# Patient Record
Sex: FEMALE | Race: WHITE | NOT HISPANIC OR LATINO | Employment: STUDENT | URBAN - METROPOLITAN AREA
[De-identification: names, ages, dates, MRNs, and addresses within clinical notes are randomized per-mention and may not be internally consistent; named-entity substitution may affect disease eponyms.]

---

## 2020-07-12 ENCOUNTER — APPOINTMENT (EMERGENCY)
Dept: RADIOLOGY | Facility: HOSPITAL | Age: 25
End: 2020-07-12
Payer: COMMERCIAL

## 2020-07-12 ENCOUNTER — HOSPITAL ENCOUNTER (OUTPATIENT)
Facility: HOSPITAL | Age: 25
Setting detail: OBSERVATION
Discharge: HOME | End: 2020-07-12
Attending: SURGERY | Admitting: SURGERY
Payer: COMMERCIAL

## 2020-07-12 ENCOUNTER — HOSPITAL ENCOUNTER (EMERGENCY)
Facility: HOSPITAL | Age: 25
End: 2020-07-12
Attending: EMERGENCY MEDICINE
Payer: COMMERCIAL

## 2020-07-12 VITALS
OXYGEN SATURATION: 98 % | RESPIRATION RATE: 20 BRPM | HEART RATE: 96 BPM | DIASTOLIC BLOOD PRESSURE: 67 MMHG | SYSTOLIC BLOOD PRESSURE: 128 MMHG | TEMPERATURE: 99.8 F

## 2020-07-12 VITALS
RESPIRATION RATE: 20 BRPM | OXYGEN SATURATION: 100 % | TEMPERATURE: 98.2 F | HEART RATE: 85 BPM | SYSTOLIC BLOOD PRESSURE: 130 MMHG | DIASTOLIC BLOOD PRESSURE: 78 MMHG

## 2020-07-12 DIAGNOSIS — S91.012A LACERATION OF LEFT ANKLE, INITIAL ENCOUNTER: Primary | ICD-10-CM

## 2020-07-12 DIAGNOSIS — S91.019A LACERATION OF ANKLE: Primary | ICD-10-CM

## 2020-07-12 LAB
ABO GROUP BLD: NORMAL
ABO GROUP BLD: NORMAL
ANION GAP SERPL CALCULATED.3IONS-SCNC: 15 MMOL/L (ref 4–13)
APTT PPP: 23 SECONDS (ref 23–37)
BASOPHILS # BLD AUTO: 0.04 THOUSANDS/ΜL (ref 0–0.1)
BASOPHILS NFR BLD AUTO: 0 % (ref 0–1)
BLD GP AB SCN SERPL QL: NEGATIVE
BUN SERPL-MCNC: 12 MG/DL (ref 5–25)
CALCIUM SERPL-MCNC: 9 MG/DL (ref 8.3–10.1)
CHLORIDE SERPL-SCNC: 102 MMOL/L (ref 100–108)
CO2 SERPL-SCNC: 20 MMOL/L (ref 21–32)
CREAT SERPL-MCNC: 0.92 MG/DL (ref 0.6–1.3)
EOSINOPHIL # BLD AUTO: 0.1 THOUSAND/ΜL (ref 0–0.61)
EOSINOPHIL NFR BLD AUTO: 1 % (ref 0–6)
ERYTHROCYTE [DISTWIDTH] IN BLOOD BY AUTOMATED COUNT: 12.6 % (ref 11.6–15.1)
GFR SERPL CREATININE-BSD FRML MDRD: 88 ML/MIN/1.73SQ M
GLUCOSE SERPL-MCNC: 98 MG/DL (ref 65–140)
HCT VFR BLD AUTO: 34.1 % (ref 34.8–46.1)
HGB BLD-MCNC: 11.5 G/DL (ref 11.5–15.4)
IMM GRANULOCYTES # BLD AUTO: 0.02 THOUSAND/UL (ref 0–0.2)
IMM GRANULOCYTES NFR BLD AUTO: 0 % (ref 0–2)
INR PPP: 1.13 (ref 0.84–1.19)
LYMPHOCYTES # BLD AUTO: 2.42 THOUSANDS/ΜL (ref 0.6–4.47)
LYMPHOCYTES NFR BLD AUTO: 25 % (ref 14–44)
MCH RBC QN AUTO: 30.4 PG (ref 26.8–34.3)
MCHC RBC AUTO-ENTMCNC: 33.7 G/DL (ref 31.4–37.4)
MCV RBC AUTO: 90 FL (ref 82–98)
MONOCYTES # BLD AUTO: 0.68 THOUSAND/ΜL (ref 0.17–1.22)
MONOCYTES NFR BLD AUTO: 7 % (ref 4–12)
NEUTROPHILS # BLD AUTO: 6.36 THOUSANDS/ΜL (ref 1.85–7.62)
NEUTS SEG NFR BLD AUTO: 67 % (ref 43–75)
NRBC BLD AUTO-RTO: 0 /100 WBCS
PLATELET # BLD AUTO: 237 THOUSANDS/UL (ref 149–390)
PMV BLD AUTO: 11 FL (ref 8.9–12.7)
POTASSIUM SERPL-SCNC: 3.2 MMOL/L (ref 3.5–5.3)
PROTHROMBIN TIME: 14.2 SECONDS (ref 11.6–14.5)
RBC # BLD AUTO: 3.78 MILLION/UL (ref 3.81–5.12)
RH BLD: POSITIVE
RH BLD: POSITIVE
SARS-COV-2 RNA RESP QL NAA+PROBE: NEGATIVE
SODIUM SERPL-SCNC: 137 MMOL/L (ref 136–145)
SPECIMEN EXPIRATION DATE: NORMAL
WBC # BLD AUTO: 9.62 THOUSAND/UL (ref 4.31–10.16)

## 2020-07-12 PROCEDURE — 96372 THER/PROPH/DIAG INJ SC/IM: CPT

## 2020-07-12 PROCEDURE — 99285 EMERGENCY DEPT VISIT HI MDM: CPT

## 2020-07-12 PROCEDURE — 73610 X-RAY EXAM OF ANKLE: CPT

## 2020-07-12 PROCEDURE — 86900 BLOOD TYPING SEROLOGIC ABO: CPT | Performed by: EMERGENCY MEDICINE

## 2020-07-12 PROCEDURE — NC001 PR NO CHARGE: Performed by: NURSE PRACTITIONER

## 2020-07-12 PROCEDURE — 86850 RBC ANTIBODY SCREEN: CPT | Performed by: EMERGENCY MEDICINE

## 2020-07-12 PROCEDURE — 90471 IMMUNIZATION ADMIN: CPT

## 2020-07-12 PROCEDURE — 87635 SARS-COV-2 COVID-19 AMP PRB: CPT | Performed by: EMERGENCY MEDICINE

## 2020-07-12 PROCEDURE — NC001 PR NO CHARGE: Performed by: ORTHOPAEDIC SURGERY

## 2020-07-12 PROCEDURE — 97116 GAIT TRAINING THERAPY: CPT

## 2020-07-12 PROCEDURE — 97166 OT EVAL MOD COMPLEX 45 MIN: CPT

## 2020-07-12 PROCEDURE — 85610 PROTHROMBIN TIME: CPT

## 2020-07-12 PROCEDURE — 96365 THER/PROPH/DIAG IV INF INIT: CPT

## 2020-07-12 PROCEDURE — 99285 EMERGENCY DEPT VISIT HI MDM: CPT | Performed by: EMERGENCY MEDICINE

## 2020-07-12 PROCEDURE — 85025 COMPLETE CBC W/AUTO DIFF WBC: CPT

## 2020-07-12 PROCEDURE — 86901 BLOOD TYPING SEROLOGIC RH(D): CPT | Performed by: EMERGENCY MEDICINE

## 2020-07-12 PROCEDURE — 97162 PT EVAL MOD COMPLEX 30 MIN: CPT

## 2020-07-12 PROCEDURE — 99219 PR INITIAL OBSERVATION CARE/DAY 50 MINUTES: CPT | Performed by: SURGERY

## 2020-07-12 PROCEDURE — 90715 TDAP VACCINE 7 YRS/> IM: CPT | Performed by: EMERGENCY MEDICINE

## 2020-07-12 PROCEDURE — 85730 THROMBOPLASTIN TIME PARTIAL: CPT

## 2020-07-12 PROCEDURE — 36415 COLL VENOUS BLD VENIPUNCTURE: CPT

## 2020-07-12 PROCEDURE — 80048 BASIC METABOLIC PNL TOTAL CA: CPT

## 2020-07-12 RX ORDER — ACETAMINOPHEN 325 MG/1
975 TABLET ORAL EVERY 8 HOURS SCHEDULED
Qty: 30 TABLET | Refills: 0 | Status: SHIPPED | OUTPATIENT
Start: 2020-07-12

## 2020-07-12 RX ORDER — OXYCODONE HYDROCHLORIDE 5 MG/1
5 TABLET ORAL EVERY 4 HOURS PRN
Qty: 30 TABLET | Refills: 0 | Status: SHIPPED | OUTPATIENT
Start: 2020-07-12 | End: 2020-07-22

## 2020-07-12 RX ORDER — POTASSIUM CHLORIDE 20 MEQ/1
40 TABLET, EXTENDED RELEASE ORAL ONCE
Status: COMPLETED | OUTPATIENT
Start: 2020-07-12 | End: 2020-07-12

## 2020-07-12 RX ORDER — LIDOCAINE HYDROCHLORIDE 10 MG/ML
10 INJECTION, SOLUTION EPIDURAL; INFILTRATION; INTRACAUDAL; PERINEURAL ONCE
Status: COMPLETED | OUTPATIENT
Start: 2020-07-12 | End: 2020-07-12

## 2020-07-12 RX ORDER — KETOROLAC TROMETHAMINE 30 MG/ML
15 INJECTION, SOLUTION INTRAMUSCULAR; INTRAVENOUS ONCE
Status: COMPLETED | OUTPATIENT
Start: 2020-07-12 | End: 2020-07-12

## 2020-07-12 RX ORDER — LEVOFLOXACIN 750 MG/1
750 TABLET ORAL EVERY 24 HOURS
Qty: 7 TABLET | Refills: 0 | Status: SHIPPED | OUTPATIENT
Start: 2020-07-13 | End: 2020-07-20

## 2020-07-12 RX ORDER — DOCUSATE SODIUM 100 MG/1
100 CAPSULE, LIQUID FILLED ORAL 2 TIMES DAILY
Status: DISCONTINUED | OUTPATIENT
Start: 2020-07-12 | End: 2020-07-12 | Stop reason: HOSPADM

## 2020-07-12 RX ORDER — CEFAZOLIN SODIUM 2 G/50ML
2000 SOLUTION INTRAVENOUS ONCE
Status: COMPLETED | OUTPATIENT
Start: 2020-07-12 | End: 2020-07-12

## 2020-07-12 RX ORDER — OXYCODONE HYDROCHLORIDE 10 MG/1
10 TABLET ORAL EVERY 4 HOURS PRN
Status: DISCONTINUED | OUTPATIENT
Start: 2020-07-12 | End: 2020-07-12 | Stop reason: HOSPADM

## 2020-07-12 RX ORDER — ONDANSETRON 2 MG/ML
4 INJECTION INTRAMUSCULAR; INTRAVENOUS EVERY 6 HOURS PRN
Status: DISCONTINUED | OUTPATIENT
Start: 2020-07-12 | End: 2020-07-12 | Stop reason: HOSPADM

## 2020-07-12 RX ORDER — ACETAMINOPHEN 325 MG/1
975 TABLET ORAL EVERY 8 HOURS SCHEDULED
Status: DISCONTINUED | OUTPATIENT
Start: 2020-07-12 | End: 2020-07-12 | Stop reason: HOSPADM

## 2020-07-12 RX ORDER — LEVOFLOXACIN 750 MG/1
750 TABLET ORAL EVERY 24 HOURS
Status: DISCONTINUED | OUTPATIENT
Start: 2020-07-12 | End: 2020-07-12 | Stop reason: HOSPADM

## 2020-07-12 RX ORDER — OXYCODONE HYDROCHLORIDE 5 MG/1
5 TABLET ORAL EVERY 4 HOURS PRN
Status: DISCONTINUED | OUTPATIENT
Start: 2020-07-12 | End: 2020-07-12 | Stop reason: HOSPADM

## 2020-07-12 RX ADMIN — LIDOCAINE HYDROCHLORIDE 10 ML: 10 INJECTION, SOLUTION EPIDURAL; INFILTRATION; INTRACAUDAL; PERINEURAL at 02:16

## 2020-07-12 RX ADMIN — KETOROLAC TROMETHAMINE 15 MG: 30 INJECTION, SOLUTION INTRAMUSCULAR; INTRAVENOUS at 01:58

## 2020-07-12 RX ADMIN — POTASSIUM CHLORIDE 40 MEQ: 1500 TABLET, EXTENDED RELEASE ORAL at 12:30

## 2020-07-12 RX ADMIN — TETANUS TOXOID, REDUCED DIPHTHERIA TOXOID AND ACELLULAR PERTUSSIS VACCINE, ADSORBED 0.5 ML: 5; 2.5; 8; 8; 2.5 SUSPENSION INTRAMUSCULAR at 01:58

## 2020-07-12 RX ADMIN — LEVOFLOXACIN 750 MG: 750 TABLET, FILM COATED ORAL at 09:14

## 2020-07-12 RX ADMIN — POTASSIUM CHLORIDE 40 MEQ: 1500 TABLET, EXTENDED RELEASE ORAL at 09:14

## 2020-07-12 RX ADMIN — CEFAZOLIN SODIUM 2000 MG: 2 SOLUTION INTRAVENOUS at 02:05

## 2020-07-12 RX ADMIN — CEFEPIME HYDROCHLORIDE 2000 MG: 2 INJECTION, POWDER, FOR SOLUTION INTRAVENOUS at 10:00

## 2020-07-12 NOTE — CONSULTS
Orthopedics   Gege Lucio Hackett 22 y o  female MRN: [id number]  Unit/Bed#: ED 14      Chief Complaint:   left ankle pain    HPI:  22 y  o female status post mechanical tripping injury complaining of left ankle pain and inability to bear weight  She notes being drunk, kicking her foot into a fishtank in a dark basement, and feeling immediate pain  No history of injury to this ankle  She was initially seen at an  UB and transferred here for further evaluation  Review Of Systems:   · Skin: Normal  · Neuro: See HPI  · Musculoskeletal: See HPI  · 14 point review of systems negative except as stated above     Past Medical History:   History reviewed  No pertinent past medical history  Past Surgical History:   History reviewed  No pertinent surgical history  Family History:  Family history reviewed and non-contributory  History reviewed  No pertinent family history      Social History:  Social History     Socioeconomic History    Marital status: Single     Spouse name: None    Number of children: None    Years of education: None    Highest education level: None   Occupational History    None   Social Needs    Financial resource strain: None    Food insecurity:     Worry: None     Inability: None    Transportation needs:     Medical: None     Non-medical: None   Tobacco Use    Smoking status: Never Smoker    Smokeless tobacco: Never Used   Substance and Sexual Activity    Alcohol use: Yes     Comment: patient report having 6 shots tonight    Drug use: Yes     Types: Marijuana    Sexual activity: None   Lifestyle    Physical activity:     Days per week: None     Minutes per session: None    Stress: None   Relationships    Social connections:     Talks on phone: None     Gets together: None     Attends [employer]ism service: None     Active member of club or organization: None     Attends meetings of clubs or organizations: None     Relationship status: None    Intimate partner violence:     Fear of current or ex partner: None     Emotionally abused: None     Physically abused: None     Forced sexual activity: None   Other Topics Concern    None   Social History Narrative    None       Allergies:   No Known Allergies        Labs:  0   Lab Value Date/Time    HCT 34 1 (L) 07/12/2020 0151    HGB 11 5 07/12/2020 0151    INR 1 13 07/12/2020 0151    WBC 9 62 07/12/2020 0151       Meds:    Current Facility-Administered Medications:     acetaminophen (TYLENOL) tablet 975 mg, 975 mg, Oral, Q8H [address], Fercho Villanueva MD    docusate sodium (COLACE) capsule 100 mg, 100 mg, Oral, BID, Fercho Villanueva MD    levofloxacin (LEVAQUIN) tablet 750 mg, 750 mg, Oral, Q24H, Beata Villanueva MD    ondansetron (ZOFRAN) injection 4 mg, 4 mg, Intravenous, Q6H PRN, Fercho Villanueva MD    oxyCODONE (ROXICODONE) immediate release tablet 10 mg, 10 mg, Oral, Q4H PRN, Fercho Villanueva MD    oxyCODONE (ROXICODONE) IR tablet 5 mg, 5 mg, Oral, Q4H PRN, Fercho Villanueva MD    potassium chloride (K-DUR,KLOR-CON) CR tablet 40 mEq, 40 mEq, Oral, Once, Nohemi Ibarra MD  No current outpatient medications on file  Blood Culture:   No results found for: BLOODCX    Wound Culture:   No results found for: WOUNDCULT    Ins and Outs:  No intake/output data recorded  Physical Exam:   /70   Pulse 90   Temp 99 8 °F (37 7 °C) (Tympanic)   Resp 20   LMP 07/01/2020 (Exact Date)   SpO2 100%   Gen: Alert and oriented to person, place, time  HEENT: EOMI, eyes clear, moist mucus membranes, hearing intact  Respiratory: Bilateral chest rise   No audible wheezing found  Cardiovascular: No audible murmurs  Abdomen: soft  Musculoskeletal: left lower extremity  · 3x2 cm anterolateral ankle laceration with exposed soft tissue  · Tender to palpation around laceration  · Unable to dorsiflex ankle or toes, able to plantarflex ankle and toes  · Able to invert at ankle  · Decreased sensation in DP distribution, sensation intact in SP, sural, saphenous, and tibial nerve distributions  · Dopplerable PT and DP pulses    Radiology:   I personally reviewed the films  X-rays left ankle shows no acute fractures or dislocations  Procedure- Orthopedics   Orval Or Lew 22 y o  female MRN: [id number]  Unit/Bed#: ED 14    Procedure:  Joint challenge and laceration closre    Once adequate anesthesia was obtained 50 cc were injected into the ankle joint without extravasation of fluid into ankle joint  Laceration was then closed loosely with 2 3-0 Nylon sutures, one running and one simple  Sterile dressing was applied and patient was placed into posterior slab splint  Patient tolerated the procedure well  Assessment:  22 y  o female status post ankle injury with anterolateral ankle laceration and possible tendon injury    Plan:   · NWB left lower extremity in posterior slab splint  · Joint challenge negative at 50 cc   Laceration closed loosely with running 3-0 Nylon sutures  · FU with Dr Flores  next week for further evaluation  · Analgesics for pain  · Would give antibiotics for 1 week with 3rd or 4th generation cephalosporin or flouroquinoline  · Tetanus updated  · Dispo: [id number] Jackeline Coronado for discharge from [address] [place] [address]

## 2020-07-12 NOTE — EMTALA/ACUTE CARE TRANSFER
[facility] EMERGENCY DEPARTMENT  [address] [facility] [address]  Dept: [phone]      EMTALA TRANSFER CONSENT    NAME Comfort Garrido                                         1995                              MRN [id number]    I have been informed of my rights regarding examination, treatment, and transfer   by Dr Santy Jin MD    Benefits: Specialized equipment and/or services available at the receiving facility (Include comment)________________________    Risks: Potential for delay in receiving treatment, Potential deterioration of medical condition, Loss of IV, Increased discomfort during transfer      Consent for Transfer:  I acknowledge that my medical condition has been evaluated and explained to me by the emergency department physician or other qualified medical person and/or my attending physician, who has recommended that I be transferred to the service of  Accepting Physician: Dr Byrd November at [address] [facility] Name, [address] : [facility]  The above potential benefits of such transfer, the potential risks associated with such transfer, and the probable risks of not being transferred have been explained to me, and I fully understand them  The doctor has explained that, in my case, the benefits of transfer outweigh the risks  I agree to be transferred  I authorize the performance of emergency medical procedures and treatments upon me in both transit and upon arrival at the receiving facility  Additionally, I authorize the release of any and all medical records to the receiving facility and request they be transported with me, if possible  I understand that the safest mode of transportation during a medical emergency is an ambulance and that the Hospital advocates the use of this mode of transport   Risks of traveling to the receiving facility by car, including absence of medical control, life sustaining equipment, such as oxygen, and medical personnel has been explained to me and I fully understand them  (MARTHA CORRECT BOX BELOW)  [  ]  I consent to the stated transfer and to be transported by ambulance/helicopter  [  ]  I consent to the stated transfer, but refuse transportation by ambulance and accept full responsibility for my transportation by car  I understand the risks of non-ambulance transfers and I exonerate the Hospital and its staff from any deterioration in my condition that results from this refusal     X___________________________________________    DATE  20  TIME________  Signature of patient or legally responsible individual signing on patient behalf           RELATIONSHIP TO PATIENT_________________________          Provider Certification    NAME Dez Mayes                                        FRANCES 1995                              MRN [id number]    A medical screening exam was performed on the above named patient  Based on the examination:    Condition Necessitating Transfer The encounter diagnosis was Laceration of ankle  Patient Condition: The patient has been stabilized such that within reasonable medical probability, no material deterioration of the patient condition or the condition of the unborn child(mateus) is likely to result from the transfer    Reason for Transfer: Level of Care needed not available at this facility(Trauma evaluation)    Transfer Requirements: Facility [facility]   · Space available and qualified personnel available for treatment as acknowledged by    · Agreed to accept transfer and to provide appropriate medical treatment as acknowledged by       Dr Ninfa Lucero  · Appropriate medical records of the examination and treatment of the patient are provided at the time of transfer   [address], [address] _______  · Transfer will be performed by qualified personnel from    and appropriate transfer equipment as required, including the use of necessary and appropriate life support measures      Provider Certification: I have examined the patient and explained the following risks and benefits of being transferred/refusing transfer to the patient/family:  General risk, such as traffic hazards, adverse weather conditions, rough terrain or turbulence, possible failure of equipment (including vehicle or aircraft), or consequences of actions of persons outside the control of the transport personnel, Unanticipated needs of medical equipment and personnel during transport, Risk of worsening condition, The possibility of a transport vehicle being unavailable      Based on these reasonable risks and benefits to the patient and/or the unborn child(mateus), and based upon the information available at the time of the patients examination, I certify that the medical benefits reasonably to be expected from the provision of appropriate medical treatments at another medical facility outweigh the increasing risks, if any, to the individuals medical condition, and in the case of labor to the unborn child, from effecting the transfer      X____________________________________________ DATE 07/12/20        TIME_______      ORIGINAL - SEND TO MEDICAL RECORDS   COPY - SEND WITH PATIENT DURING TRANSFER

## 2020-07-12 NOTE — UTILIZATION REVIEW
Initial Clinical Review    Admission: Date/Time/Statement: Admission Orders (From admission, onward)     Ordered        07/12/20 0420  Place in Observation  Once                   Orders Placed This Encounter   Procedures    Place in Observation     Standing Status:   Standing     Number of Occurrences:   1     Order Specific Question:   Admitting Physician     Answer:   Tom Renner     Order Specific Question:   Level of Care     Answer:   Med Surg [16]     ED Arrival Information     Expected Arrival Acuity Means of Arrival Escorted By Service Admission Type    7/12/2020 7/12/2020 03:03 Immediate Ambulance [facility] Dax Staggers) Trauma Emergency    Arrival Complaint    Foot injury        Chief Complaint   Patient presents with    Trauma     Trauma transfer from [facility]     Assessment/Plan: 22 y o  female with no significant PMHx who presents as a trauma transfer from [address] [place] [address] after she sustained an open laceration to her left anterior lateral ankle  Pt was drinking today, went into a dark basement and somehow stepped on a fish tank, the glass broke and lacerated her ankle  Neurovascularly intact pre transfer  No other injuries noted  Did not hit her head or have loc  Patient's bleeding was able to be controlled at outside hospital, transferred in a pressure dressing  States that now with the pressure dressing removed, she feels like the sensation is coming back in her foot  Admit observation to M/S unit under trauma service with left lateral anterior ankle open laceration, possible tendinous injury   Trauma Plan:   -Ortho consult:  · NWB left lower extremity in posterior slab splint  · Joint challenge negative at 50 cc   Laceration closed loosely with running 3-0 Nylon sutures  · FU with Dr Brendon Huber next week for further evaluation  · Analgesics for pain  · Would give antibiotics for 1 week with 3rd or 4th generation cephalosporin or flouroquinoline  · Tetanus updated  -PT, OT to see        ED Triage Vitals   Temperature Pulse Respirations Blood Pressure SpO2   07/12/20 0315 07/12/20 0315 07/12/20 0315 07/12/20 0315 07/12/20 0315   99 8 °F (37 7 °C) 90 20 136/70 100 %      Temp Source Heart Rate Source Patient Position - Orthostatic VS BP Location FiO2 (%)   07/12/20 0315 07/12/20 0315 07/12/20 0315 -- --   Tympanic Monitor Lying        Pain Score       07/12/20 0318       2        Wt Readings from Last 1 Encounters:   07/12/20 70 kg (154 lb 5 2 oz)     Additional Vital Signs:   Date/Time  Temp  Pulse  Resp  BP  SpO2  Patient Position - Orthostatic VS   07/12/20 0430    96  20  128/67  98 %  Lying   07/12/20 0415    100  20  145/84  98 %  Lying   07/12/20 0315  99 8 °F (37 7 °C)  90  20  136/70  100 %  Lying       Pertinent Labs/Diagnostic Test Results:   XR L ankle 7/12 -- Overlying bandage as described, but no acute osseous abnormality is seen   No discrete radiopaque foreign body is seen  Results from last 7 days   Lab Units 07/12/20  0206   SARS-COV-2  Negative     Results from last 7 days   Lab Units 07/12/20  0151   WBC Thousand/uL 9 62   HEMOGLOBIN g/dL 11 5   HEMATOCRIT % 34 1*   PLATELETS Thousands/uL 237   NEUTROS ABS Thousands/µL 6 36     Results from last 7 days   Lab Units 07/12/20  0151   SODIUM mmol/L 137   POTASSIUM mmol/L 3 2*   CHLORIDE mmol/L 102   CO2 mmol/L 20*   ANION GAP mmol/L 15*   BUN mg/dL 12   CREATININE mg/dL 0 92   EGFR ml/min/1 73sq m 88   CALCIUM mg/dL 9 0     Results from last 7 days   Lab Units 07/12/20  0151   GLUCOSE RANDOM mg/dL 98     Results from last 7 days   Lab Units 07/12/20  0151   PROTIME seconds 14 2   INR  1 13   PTT seconds 23         History reviewed  No pertinent past medical history    Present on Admission:  **None**      Admitting Diagnosis: Foot injury [B86 649A]  Laceration of left ankle, initial encounter [Z21 012A]  Age/Sex: 22 y o  female  Admission Orders:  Scheduled Medications:  Medications:  acetaminophen 975 mg Oral [facility] cefepime 2,000 mg Intravenous Once   docusate sodium 100 mg Oral BID   levofloxacin 750 mg Oral Q24H        PRN Meds:  ondansetron 4 mg Intravenous Q6H PRN   oxyCODONE 10 mg Oral Q4H PRN   oxyCODONE 5 mg Oral Q4H PRN       IP CONSULT TO ORTHOPEDIC SURGERY    Network Utilization Review Department  [email or web address]  ATTENTION: Please call with any questions or concerns to [phone] and carefully listen to the prompts so that you are directed to the right person  All voicemails are confidential   Nick Delude all requests for admission clinical reviews, approved or denied determinations and any other requests to dedicated fax number below belonging to the campus where the patient is receiving treatment   List of dedicated fax numbers for the Facilities:  [address] DENIALS (Administrative/Medical Necessity) [phone]   [address] (Maternity/NICU/Pediatrics) [phone]   Jossy Cuello [phone]   [facility] [phone]   [place]  [phone]   Estevan Garcia [phone]   [address] [place] [address] [phone]   [facility]  [phone]   [address] [facility] [address]  [address] [facility] [address] [facility] [phone]

## 2020-07-12 NOTE — OCCUPATIONAL THERAPY NOTE
Occupational Therapy Evaluation     Patient Name: Marine Flowers  [id number] Date: 7/12/2020  Problem List  Principal Problem:    Laceration of left ankle    Past Medical History  History reviewed  No pertinent past medical history  Past Surgical History  History reviewed  No pertinent surgical history  07/12/20 1047   Note Type   Note type Eval only   Restrictions/Precautions   Weight Bearing Precautions Per Order Yes   LLE Weight Bearing Per Order NWB   Braces or Orthoses Splint  (L LE)   Other Precautions Multiple lines   Pain Assessment   Pain Assessment Tool 0-10   Pain Score No Pain   Home Living   Type of [address] [facility] Two level;Stairs to enter with rails   Bathroom Shower/Tub Walk-in shower   Bathroom Toilet Standard   Bathroom Equipment   (denies)   [address]   Additional Comments Pt lives in a 2 story home with 1+1 CARLOS  Prior Function   Level of [place] Independent with ADLs and functional mobility   Lives With Family  (Father)   ADL Assistance Independent   IADLs Independent   Falls in the last 6 months 0   Vocational Student   Lifestyle   Autonomy Pt reports being I with ADLS, IADLS and mobility without device PTA  (+)     Reciprocal Relationships Pt lives with her father who she reports is able to assist as needed upon d/c     Service to Others Pt works 3 jobs and goes to school    Intrinsic Gratification Active PTA   Subjective   Subjective Pt reports that she has no concerns about returning home      ADL   Where Assessed Edge of bed   Eating Assistance 7  Independent   Grooming Assistance 5  Supervision/Setup   UB Bathing Assistance 5  Supervision/Setup   LB Bathing Assistance 5  Supervision/Setup   UB Dressing Assistance 5  Supervision/Setup   LB Dressing Assistance 5  Supervision/Setup   Toileting Assistance  5  Supervision/Setup   Bed Mobility   Supine to Sit 6  Modified independent   Sit to Supine 6 Modified independent   Transfers   Sit to Stand 6  Modified independent   Stand to Sit 6  Modified independent   Functional Mobility   Functional Mobility 5  Supervision   Additional Comments Pt demonstrated household mobility with 1 seated rest break  Additional items Crutches   Balance   Static Sitting Good   Dynamic Sitting Fair +   Static Standing Fair +   Dynamic Standing Fair +   Ambulatory Fair   Activity Tolerance   Activity Tolerance Patient tolerated treatment well   Medical Staff Made Aware PT Simba   Nurse Made Aware RN confirmed okay to see pt   RUE Assessment   RUE Assessment WFL   LUE Assessment   LUE Assessment WFL   Cognition   Overall Cognitive Status WFL   Arousal/Participation Alert; Cooperative   Attention Within functional limits   Orientation Level Oriented X4   Memory Within functional limits   Following Commands Follows all commands and directions without difficulty   Comments Pt is pleasant and cooperative to work with therapy  Assessment   Limitation Decreased ADL status; Decreased high-level ADLs   Prognosis Good   Assessment Pt is a 22 y o  female admitted to \[facility]\"" on 7/12/2020 w/ laceration of left ankle  Pt with active OT orders  Pt is NWB on L LE with splint on  Pt resides in a 2 story home with 1+1 CARLOS  Pt lives with her father who she reports is able to assist as needed upon d/c  Pt was I w/  ADLS and IADLS, (+) drove, & required no use of DME PTA  Currently pt is mod I for functional transfers and functional mobility, and supervision for ADLS overall  Based on the aforementioned OT evaluation, functional performance deficits, and assessments, pt has been identified as a moderate complexity evaluation  From OT standpoint, anticipate d/c home with family support  Recommend continued participation in [address] [facility] and functional mobility with staff  No further acute OT needs, d/c OT      Goals   Patient Goals To return home today   Recommendation   OT Discharge Recommendation Return to previous environment with social support   OT - OK to Discharge Yes  (When medically appropriate)   Modified Orange Scale   Modified Orange Scale 3     Beverly Edwards, SUSHMA, OTR/L

## 2020-07-12 NOTE — TRAUMA DOCUMENTATION
Report called to [facility] at [facility]

## 2020-07-12 NOTE — ED PROVIDER NOTES
History  Chief Complaint   Patient presents with    Foot Laceration     Stepped on a fish tank, cut ankle/foot     Note entered in Error, please see trauma H&P note which was attached to the encounter at [facility]          None       History reviewed  No pertinent past medical history  History reviewed  No pertinent surgical history  History reviewed  No pertinent family history  I have reviewed and agree with the history as documented      E-Cigarette/Vaping     E-Cigarette/Vaping Substances     Social History     Tobacco Use    Smoking status: Never Smoker    Smokeless tobacco: Never Used   Substance Use Topics    Alcohol use: Yes     Comment: patient report having 6 shots tonight    Drug use: Yes     Types: Marijuana       Review of Systems    Physical Exam  Physical Exam    Vital Signs  ED Triage Vitals [07/12/20 0056]   Temperature Pulse Respirations Blood Pressure SpO2   98 2 °F (36 8 °C) 96 20 130/76 100 %      Temp Source Heart Rate Source Patient Position - Orthostatic VS BP Location FiO2 (%)   Tympanic Monitor Sitting Left arm --      Pain Score       Worst Possible Pain           Vitals:    07/12/20 0115 07/12/20 0130 07/12/20 0145 07/12/20 0200   BP: 123/73 122/74 121/58 130/78   Pulse: 105 (!) 107 (!) 121 85   Patient Position - Orthostatic VS: Lying Lying Lying Lying         Visual Acuity  Visual Acuity      Most Recent Value   L Pupil Size (mm)  4   R Pupil Size (mm)  4          ED Medications  Medications   lidocaine (PF) (XYLOCAINE-MPF) 1 % injection 10 mL (10 mL Infiltration Given 7/12/20 0216)   tetanus-diphtheria-acellular pertussis (BOOSTRIX) IM injection 0 5 mL (0 5 mL Intramuscular Given 7/12/20 0158)   ketorolac (TORADOL) injection 15 mg (15 mg Intramuscular Given 7/12/20 0158)   ceFAZolin (ANCEF) IVPB (premix) 2,000 mg 50 mL (2,000 mg Intravenous New Bag 7/12/20 0205)       Diagnostic Studies  Results Reviewed     Procedure Component Value Units Date/Time Novel Coronavirus [facility] [[id number]]  (Normal) Collected:  07/12/20 0206    Lab Status:  Final result Specimen:  Nares from Nose Updated:  07/12/20 0324     SARS-CoV-2 Negative    Narrative: The specimen collection materials, transport medium, and/or testing methodology utilized in the production of these test results have been proven to be reliable in a limited validation with an abbreviated program under the Emergency Utilization Authorization provided by the FDA  Testing reported as "Presumptive positive" will be confirmed with secondary testing with a reference laboratory to ensure result accuracy  Clinical caution and judgement should be used with the interpretation of these results with consideration of the clinical impression and other laboratory testing  Testing reported as "Positive" or "Negative" has been proven to be accurate according to standard laboratory validation requirements  All testing is performed with control materials showing appropriate reactivity at standard intervals        APTT [[id number]]  (Normal) Collected:  07/12/20 0151    Lab Status:  Final result Specimen:  Blood from Arm, Right Updated:  07/12/20 0233     PTT 23 seconds     Protime-INR [[id number]]  (Normal) Collected:  07/12/20 0151    Lab Status:  Final result Specimen:  Blood from Arm, Right Updated:  07/12/20 0233     Protime 14 2 seconds      INR 0 16    Basic metabolic panel [[id number]]  (Abnormal) Collected:  07/12/20 0151    Lab Status:  Final result Specimen:  Blood from Arm, Right Updated:  07/12/20 0232     Sodium 137 mmol/L      Potassium 3 2 mmol/L      Chloride 102 mmol/L      CO2 20 mmol/L      ANION GAP 15 mmol/L      BUN 12 mg/dL      Creatinine 0 92 mg/dL      Glucose 98 mg/dL      Calcium 9 0 mg/dL      eGFR 88 ml/min/1 73sq m     Narrative:       [place] guidelines for Chronic Kidney Disease (CKD):     Stage 1 with normal or high GFR (GFR > 90 mL/min/1 73 square meters)    Stage 2 Mild CKD (GFR = 60-89 mL/min/1 73 square meters)    Stage 3A Moderate CKD (GFR = 45-59 mL/min/1 73 square meters)    Stage 3B Moderate CKD (GFR = 30-44 mL/min/1 73 square meters)    Stage 4 Severe CKD (GFR = 15-29 mL/min/1 73 square meters)    Stage 5 End Stage CKD (GFR <15 mL/min/1 73 square meters)  Note: GFR calculation is accurate only with a steady state creatinine    CBC and differential [[id number]]  (Abnormal) Collected:  07/12/20 0151    Lab Status:  Final result Specimen:  Blood from Arm, Right Updated:  07/12/20 0202     WBC 9 62 Thousand/uL      RBC 3 78 Million/uL      Hemoglobin 11 5 g/dL      Hematocrit 34 1 %      MCV 90 fL      MCH 30 4 pg      MCHC 33 7 g/dL      RDW 12 6 %      MPV 11 0 fL      Platelets 084 Thousands/uL      nRBC 0 /100 WBCs      Neutrophils Relative 67 %      Immat GRANS % 0 %      Lymphocytes Relative 25 %      Monocytes Relative 7 %      Eosinophils Relative 1 %      Basophils Relative 0 %      Neutrophils Absolute 6 36 Thousands/µL      Immature Grans Absolute 0 02 Thousand/uL      Lymphocytes Absolute 2 42 Thousands/µL      Monocytes Absolute 0 68 Thousand/µL      Eosinophils Absolute 0 10 Thousand/µL      Basophils Absolute 0 04 Thousands/µL     POCT pregnancy, urine [[id number]]     Lab Status:  No result                  XR ankle 3+ views LEFT    (Results Pending)              Procedures  Procedures         ED Course  ED Course as of Jul 12 0342   Sun Jul 12, 2020   0147 Tourniquet applied for approximately 20 minutes 10 w/ active bleeding, 5 w/ tighter tourniquet, was able to identify an arterial bleeder distal aspect of the laceration, the bleeding stopped currently, patient did have a vasovagal episode or she felt shaky, lightheaded, improved with laying down, patient started on fluids      0148 The foot remains neurovascularly intact with intact sensation, dopplerable DP      0201 Spoke to Dr Daryle Moon, [facility] for trauma evaluation US AUDIT      Most Recent Value   Initial Alcohol Screen: US AUDIT-C    1  How often do you have a drink containing alcohol? 1 Filed at: 07/12/2020 0057   2  How many drinks containing alcohol do you have on a typical day you are drinking? 1 Filed at: 07/12/2020 0057   3a  Male UNDER 65: How often do you have five or more drinks on one occasion? 0 Filed at: 07/12/2020 0057   3b  FEMALE Any Age, or MALE 65+: How often do you have 4 or more drinks on one occassion? 1 Filed at: 07/12/2020 0057   Audit-C Score  3 Filed at: 07/12/2020 0057                  MIGUEL/DAST-10      Most Recent Value   How many times in the past year have you    Used an illegal drug or used a prescription medication for non-medical reasons? Never Filed at: 07/12/2020 0057                                MDM      Disposition  Final diagnoses:   Laceration of ankle     Time reflects when diagnosis was documented in both MDM as applicable and the Disposition within this note     Time User Action Codes Description Comment    7/12/2020  1:57 AM Gerline Patterson Add [S91 319A] Laceration of foot     7/12/2020  1:58 AM Gerline Marco Add [R58] Mesenteric arterial bleeding     7/12/2020  1:58 AM Gerline Patterson Remove [R58] Mesenteric arterial bleeding     7/12/2020  1:58 AM Gerline Patterson Remove [A47 401K] Laceration of foot     7/12/2020  1:58 AM Gerline Marco Add [S91 019A] Laceration of ankle       ED Disposition     ED Disposition Condition Date/Time Comment    Transfer to Another Facility-In Network  [place] Jul 12, 2020 0157 Tristen Hackett should be transferred out to [facility], Dr Shahbaz Owens MD Documentation      Most Recent Value   Patient Condition  The patient has been stabilized such that within reasonable medical probability, no material deterioration of the patient condition or the condition of the unborn child(mateus) is likely to result from the transfer   Reason for Transfer  Level of Care needed not available at this facility [facility] evaluation]   Benefits of Transfer  Specialized equipment and/or services available at the receiving facility (Include comment)________________________   Risks of Transfer  Potential for delay in receiving treatment, Potential deterioration of medical condition, Loss of IV, Increased discomfort during transfer   Accepting Physician  [address] Name, [facility]   Sending MD Nancy Mcdonnell   Provider Certification  General risk, such as traffic hazards, adverse weather conditions, rough terrain or turbulence, possible failure of equipment (including vehicle or aircraft), or consequences of actions of persons outside the control of the transport personnel, Unanticipated needs of medical equipment and personnel during transport, Risk of worsening condition, The possibility of a transport vehicle being unavailable      RN Documentation      [address] Name, [address]   [facility]   Bed Assignment  ER TBD   Report Given to  Alysha Arredondo RN       Follow-up Information    None         There are no discharge medications for this patient  No discharge procedures on file      PDMP Review     None          ED Provider  Electronically Signed by           Rajani Patten MD  07/12/20 3305

## 2020-07-12 NOTE — PHYSICAL THERAPY NOTE
PHYSICAL THERAPY NOTE          Patient Name: Feng Rondon  TMMTX'P Date: 7/12/2020 07/12/20 1048   Pain Assessment   Pain Assessment Tool Pain Assessment not indicated - pt denies pain   Pain Score No Pain   Restrictions/Precautions   LLE Weight Bearing Per Order NWB   Braces or Orthoses Splint  ((L) ankle; also ace wrapped)   Other Precautions Multiple lines;WBS   General   Additional Pertinent History Additional follow up consecutive session performed to progress to ax crutches training and steps negotiation for tent D/C home today; Response to Previous Treatment Patient with no complaints from previous session  Cognition   Overall Cognitive Status WFL   Arousal/Participation Alert; Cooperative   Attention Within functional limits   Orientation Level Oriented to person;Oriented to place;Oriented to situation   Memory Within functional limits   Following Commands Follows all commands and directions without difficulty   Subjective   Subjective Pt is observed in bed w/ LE elevated; agreeable to try crutches; denies pain at present   Bed Mobility   Supine to Sit 7  Independent   Sit to Supine 7  Independent   Transfers   Sit to Stand 6  Modified independent  (2 trials w/ ax crutches (incl for fitting of crutches))   Additional items Verbal cues  (reviewed proper hand placement and crutches management)   Stand to Sit 6  Modified independent  (2 trials)   Additional items Verbal cues  (reviewed proper hand placement and crutches management)   Ambulation/Elevation   Gait pattern   (hop to pattern;no overt uncorrected LOB or excessive swaying)   Gait Assistance 6  Modified independent   Additional items   (reviewed sequencing and precautions)   Assistive Device Axillary crutches  (reviewed safety and precautions w/ use of crutches; fitted )   Distance 10 ft followed by curb/step negotiation followed by 15 ft and 20 ft w/ additional steps negotiation in between   Loren Islas [address] 6  Modified independent  (informed about more controlled placement of (R) foot on step)   Additional items Verbal cues  (reviewed sequencing and precautions w/ use of ax crutches)   Stair Management Technique With crutches; One rail R  (hop to pattern;)   Number of Stairs 2  (unable to perform more steps due to IV line (ongoing abx))   Curbs Pt negotiated 1 step/curb w/ (B) ax crutches and (S)x1 mainly for IV line management; reviewed sequencing and ax crutches precautions/management on the curb; pt expressed understanding and return demonstrated proper technique; pt did not feel she needed to practice a curb step again; pt was recommended to have a stand by (A) on the steps from caregiver at least initially; pt expressed understanding     Balance   Static Sitting Good   Static Standing Fair +   Ambulatory Fair   Activity Tolerance   Activity Tolerance Patient tolerated treatment well  (no increased discomfort (R) foot  post steps)   Medical Staff Made Aware spoke to Kanu Felix NP w/ trauma   Nurse Made Aware spoke to Anderson Wells RN   Assessment   Assessment Additional follow up consecutive session performed to progress to use of ax crutches and for steps/curb negotiation/ training prior to D/C home; pt was able to demonstrate mod (I) level w/ all aspects of observed mobility on level surfaces and (S)/mod (I) level on the curb/steps w/ use of ax crutches and no excessive fatigue or increased discomfort expressed; when negotiating steps down, decreased control of (R) foot placement noted on the 1st step down --> education provided; pt denies any discomfort at that point or post mobilization; (R) heel was inspected after returning back to bed -> no tenderness, erythema or ecchymosis noted; pt was fitted and provided w/ a pair of ax crutches to use at home; pt expressed no concerns about returning home when medically cleared; no other immediate PT needs otherwise identified; will sign off;   Barriers to Discharge None   Goals   Patient Goals to go home today   PT Treatment Day 1   Plan   Treatment/Interventions Spoke to nursing;Spoke to advanced practitioner;OT   Progress Discontinue PT   Recommendation   PT Discharge Recommendation Return to previous environment with social support   Equipment Recommended Crutches  (fitted and provided for use at home)     Yariel Stoddard, PT

## 2020-07-12 NOTE — DISCHARGE SUMMARY
Discharge Summary - Santhosh Hackett 22 y o  female MRN: [id number]    Unit/Bed#: [facility] 823-01 Encounter: [id number]    Admission Date:   Admission Orders (From admission, onward)     Ordered        07/12/20 0420  Place in Observation  Once                     Admitting Diagnosis: Foot injury [S99 929A]  Laceration of left ankle, initial encounter [S91 012A]    HPI: per resident:  MONTY Villanueva:  "Larence Nissen is a 22 y o  female not on anticoagulation who presents as a trauma transfer after she sustained an open laceration to her left anterior lateral ankle  Was drinking today, went into a dark basement and somehow stepped on a fish tank, the glass broke and lacerated her ankle  Neurovascularly intact pre transfer  No other injuries noted  Did not hit her head or lose consciousness  Patient's bleeding was able to be controlled at outside hospital, transferred in a pressure dressing  States that now with a pressure dressing removed, she feels like the sensation is coming back in her foot "    Procedures Performed: No orders of the defined types were placed in this encounter  Summary of Hospital Course: 22year old female s/p complex left ankle laceration from stepping on an aquarium and glass shattering  Ortho consulted and tool patient to OR  Ortho will follow up as outpatient  7 days of antibiotics as well non-weight bearing on LLE  Seen by therapy and passed with crutches  Also follow up with PCP  Significant Findings, Care, Treatment and Services Provided: Xr Ankle 3+ Views Left    Result Date: 7/12/2020  Impression: Overlying bandage as described, but no acute osseous abnormality is seen  No discrete radiopaque foreign body is seen   Workstation performed: [id number]       Complications: none    Discharge Diagnosis: Complex left ankle laceration    Resolved Problems  Date Reviewed: 7/12/2020    None          Condition at Discharge: stable         Discharge instructions/Information to patient and family:   See after visit summary for information provided to patient and family  Provisions for Follow-Up Care:  See after visit summary for information related to follow-up care and any pertinent home health orders  PCP: ARTEMIO Lamb    Disposition: Home    Planned Readmission: No      Discharge Statement   I spent 27 minutes discharging the patient  This time was spent on the day of discharge  I had direct contact with the patient on the day of discharge  Additional documentation is required if more than 30 minutes were spent on discharge  Discharge Medications:  See after visit summary for reconciled discharge medications provided to patient and family

## 2020-07-12 NOTE — PROGRESS NOTES
Progress Note Mili Hackett 1995, 22 y o  female MRN: [id number]    Unit/Bed#: [facility] 823-01 Encounter: [id number]    Primary Care Provider: ARTEMIO Cain   Date and time admitted to hospital: 7/12/2020  3:03 AM        Laceration of left ankle  Assessment & Plan  Ortho consulted , seen and are following patient  Splint to Left ankle  NWB LLE  Antibiotics, initiated inpatient and will follow up as outpatient  Call Ortho to be seen in [facility] office on Monday or Tuesday  Pain management              Prophylaxis: Sequential compression device (Venodyne)     Disposition: home    Code status:  Level 1 - Full Code    Consultants: Orthopaedics    Is the patient 72 years or older?: No          SUBJECTIVE:     Transfer from: site of injury  Outside Films Received: no  Tertiary Exam Due on: 7/12/20    Mechanism of Injury:Other: complex laceration    Details related to Injury: +LOC:  no    Chief Complaint: left ankle discomfort    HPI/Last 24 hour events: Admitted to Trauma, Ortho consulted, taken to OR and ready for discharge home    NWB Left ankle    Active medications:           Current Facility-Administered Medications:     acetaminophen (TYLENOL) tablet 975 mg, 975 mg, Oral, Q8H SERJIO    docusate sodium (COLACE) capsule 100 mg, 100 mg, Oral, BID    levofloxacin (LEVAQUIN) tablet 750 mg, 750 mg, Oral, Q24H, 750 mg at 07/12/20 0914    ondansetron (ZOFRAN) injection 4 mg, 4 mg, Intravenous, Q6H PRN    oxyCODONE (ROXICODONE) immediate release tablet 10 mg, 10 mg, Oral, Q4H PRN    oxyCODONE (ROXICODONE) IR tablet 5 mg, 5 mg, Oral, Q4H PRN      OBJECTIVE:     Vitals:   Vitals:    07/12/20 0430   BP: 128/67   Pulse: 96   Resp: 20   Temp:    SpO2: 98%       Physical Exam:   GENERAL APPEARANCE: Comfortable  NEURO: GCS - 15, deficit in left foot  HEENT: EOM's intact  CV: RRR< no complaint of chest pain  LUNGS: CTA bilaterally, no shortness of breath  GI: tolerating a diet  : voiding  MSK: moving UE's b/l and RLE  SKIN: warm and dry    I/O:   I/O       07/10 0701 - 07/11 0700 07/11 0701 - 07/12 0700 07/12 0701 - 07/13 0700    P  O   0     Total Intake  0     Net  0                  Invasive Devices: Invasive Devices     Peripheral Intravenous Line            Peripheral IV 07/12/20 Right Antecubital less than 1 day                  Imaging:   Xr Ankle 3+ Views Left    Result Date: 7/12/2020  Impression: Overlying bandage as described, but no acute osseous abnormality is seen  No discrete radiopaque foreign body is seen  Workstation performed: [id number]       Labs: Results for Radha Elizalde (MRN [id number]) as of 7/12/2020 11:19   Ref   Range 7/12/2020 01:51 7/12/2020 01:59   Sodium Latest Ref Range: 136 - 145 mmol/L 137    Potassium Latest Ref Range: 3 5 - 5 3 mmol/L 3 2 (L)    Chloride Latest Ref Range: 100 - 108 mmol/L 102    CO2 Latest Ref Range: 21 - 32 mmol/L 20 (L)    Anion Gap Latest Ref Range: 4 - 13 mmol/L 15 (H)    BUN Latest Ref Range: 5 - 25 mg/dL 12    Creatinine Latest Ref Range: 0 60 - 1 30 mg/dL 0 92    Glucose, Random Latest Ref Range: 65 - 140 mg/dL 98    Calcium Latest Ref Range: 8 3 - 10 1 mg/dL 9 0    eGFR Latest Units: ml/min/1 73sq m 88    WBC Latest Ref Range: 4 31 - 10 16 Thousand/uL 9 62    Red Blood Cell Count Latest Ref Range: 3 81 - 5 12 Million/uL 3 78 (L)    Hemoglobin Latest Ref Range: 11 5 - 15 4 g/dL 11 5    HCT Latest Ref Range: 34 8 - 46 1 % 34 1 (L)    MCV Latest Ref Range: 82 - 98 fL 90    MCH Latest Ref Range: 26 8 - 34 3 pg 30 4    MCHC Latest Ref Range: 31 4 - 37 4 g/dL 33 7    RDW Latest Ref Range: 11 6 - 15 1 % 12 6    Platelet Count Latest Ref Range: 149 - 390 Thousands/uL 237    MPV Latest Ref Range: 8 9 - 12 7 fL 11 0    nRBC Latest Units: /100 WBCs 0    Neutrophils % Latest Ref Range: 43 - 75 % 67    Immat GRANS % Latest Ref Range: 0 - 2 % 0    Lymphocytes Relative Latest Ref Range: 14 - 44 % 25    Monocytes Relative Latest Ref Range: 4 - 12 % 7 Eosinophils Latest Ref Range: 0 - 6 % 1    Basophils Relative Latest Ref Range: 0 - 1 % 0    Immature Grans Absolute Latest Ref Range: 0 00 - 0 20 Thousand/uL 0 02    Absolute Neutrophils Latest Ref Range: 1 85 - 7 62 Thousands/µL 6 36    Lymphocytes Absolute Latest Ref Range: 0 60 - 4 47 Thousands/µL 2 42    Absolute Monocytes Latest Ref Range: 0 17 - 1 22 Thousand/µL 0 68    Absolute Eosinophils Latest Ref Range: 0 00 - 0 61 Thousand/µL 0 10    Basophils Absolute Latest Ref Range: 0 00 - 0 10 Thousands/µL 0 04    Protime Latest Ref Range: 11 6 - 14 5 seconds 14 2    INR Latest Ref Range: 0 84 - 1 19  1 13    PTT Latest Ref Range: 23 - 37 seconds 23    ABO Grouping Unknown A    Rh Factor Unknown Positive    Antibody Screen Unknown Negative    Specimen Expiration Date Unknown [id number]    XR ANKLE 3+ VW LEFT Unknown  Rpt

## 2020-07-12 NOTE — ED PROVIDER NOTES
Emergency Department Trauma Note  Feng Rondon 22 y o  female MRN: [id number]  Unit/Bed#: ED 06/ED 06 Encounter: [id number]      Trauma Alert: Trauma Acuity: Trauma Evaluation  Model of Arrival: Mode of Arrival: BLS via    Trauma Team: Current Providers  Attending Provider: Billy Figueroa MD  Consultants: None      History of Present Illness     Chief Complaint:   Chief Complaint   Patient presents with    Foot Laceration     Stepped on a fish tank, cut ankle/foot     HPI:  Feng Rondon is a 22 y o  female who presents with Laceration of ankle  Mechanism:Details of Incident: patient kicked a fish tank with left foot resulting in laceration  Injury Date: 07/12/20 Injury Time: 36      81-year-old female with no significant past medical history presents for evaluation of laceration to the left foot after cutting it on broken fishtank  She does admit to drinking some alcohol this evening though appears to be clinically sober  During the injury she did not fall, did not lose consciousness  Initially felt some numbness of her foot however that is now resolving  On EMS arrival they noted a large laceration on the lateral aspect of the right ankle, oozing some blood    Bleeding stopped with pressure prior to arrival   She arrives in a pressure dressing, neurovascularly intact          Initially there was no active bleeding however once I attempted to clean the laceration there was noted to be a briskly bleeding arterial injury it and the distal aspect of the foot, I was able to place a tourniquet of the foot, the tourniquet was present for approximately 15 minutes and I was able to ligate the arterial bleed, the foot remained neurovascularly intact however was somewhat cool, there were dopplerable DP pulses intact sensation and intact motor the distal foot given the depth and extent of the laceration as well as the arterial injury I spoke to Dr Kirstin Nguyen at [facility] who accepted patient for transfer and trauma evaluation, patient currently not actively bleeding after pressure dressing was applied to the open laceration, patient will be given Ancef, tetanus update and patient will be transferred to [facility] for trauma evaluation        Review of Systems   Constitutional: Negative for appetite change and fever  HENT: Negative for rhinorrhea and sore throat  Eyes: Negative for photophobia and visual disturbance  Respiratory: Negative for cough, chest tightness and wheezing  Cardiovascular: Negative for chest pain, palpitations and leg swelling  Gastrointestinal: Negative for abdominal distention, abdominal pain, blood in stool, constipation and diarrhea  Genitourinary: Negative for dysuria, flank pain, frequency, hematuria and urgency  Musculoskeletal: Negative for back pain  Skin: Positive for wound  Negative for rash  Neurological: Negative for dizziness, weakness and headaches  All other systems reviewed and are negative  Historical Information     Immunizations:   Immunization History   Administered Date(s) Administered    Tdap 07/12/2020       History reviewed  No pertinent past medical history  History reviewed  No pertinent family history  History reviewed  No pertinent surgical history    Social History     Tobacco Use    Smoking status: Never Smoker    Smokeless tobacco: Never Used   Substance Use Topics    Alcohol use: Yes     Comment: patient report having 6 shots tonight    Drug use: Yes     Types: Marijuana     E-Cigarette/Vaping     E-Cigarette/Vaping Substances       Family History: non-contributory    Meds/Allergies   None       No Known Allergies    PHYSICAL EXAM    PE limited by: None    Objective   Vitals:   First set: Temperature: 98 2 °F (36 8 °C) (07/12/20 0056)  Pulse: 96 (07/12/20 0056)  Respirations: 20 (07/12/20 0056)  Blood Pressure: 130/76 (07/12/20 0056)  SpO2: 100 % (07/12/20 0056)    Primary Survey:   (A) Airway: Intact  (B) Breathing: CTA B/L  (C) Circulation: Pulses:   Dopplerable Left PT  (D) Disabliity:  GCS Total:  15  (E) Expose:  Completed    Secondary Survey: (Click on Physical Exam tab above)  Physical Exam   Constitutional: She is oriented to person, place, and time  She appears well-developed and well-nourished  HENT:   Head: Normocephalic and atraumatic  Eyes: Pupils are equal, round, and reactive to light  EOM are normal    Neck: Normal range of motion  Neck supple  Cardiovascular: Normal rate and regular rhythm  Exam reveals no gallop and no friction rub  No murmur heard  Pulmonary/Chest: Effort normal  She has no wheezes  She has no rales  She exhibits no tenderness  Abdominal: Soft  She exhibits no distension and no mass  There is no rebound and no guarding  Musculoskeletal:   Anterior foot laceration noted on the left foot, laceration is deep involving fascia and muscle tissue, the extremity distally is cool with dopplerable DP pulses, intact sensation, patient able to move her foot    Initially slow oozing from the laceration however once laceration was cleaned a clot dislodged and brisk arterial bleeding of the distal aspect of the laceration was noted   Neurological: She is alert and oriented to person, place, and time  Skin: Skin is warm and dry  Psychiatric: She has a normal mood and affect  Nursing note and vitals reviewed  Invasive Devices     Peripheral Intravenous Line            Peripheral IV 07/12/20 Right Antecubital less than 1 day                Lab Results:   Results Reviewed     Procedure Component Value Units Date/Time    Novel Coronavirus [facility] [place] [facility] [[id number]]  (Normal) Collected:  07/12/20 0206    Lab Status:  Final result Specimen:  Nares from Nose Updated:  07/12/20 0324     SARS-CoV-2 Negative    Narrative:        The specimen collection materials, transport medium, and/or testing methodology utilized in the production of these test results have been proven to be reliable in a limited validation with an abbreviated program under the Emergency Utilization Authorization provided by the FDA  Testing reported as "Presumptive positive" will be confirmed with secondary testing with a reference laboratory to ensure result accuracy  Clinical caution and judgement should be used with the interpretation of these results with consideration of the clinical impression and other laboratory testing  Testing reported as "Positive" or "Negative" has been proven to be accurate according to standard laboratory validation requirements  All testing is performed with control materials showing appropriate reactivity at standard intervals        APTT [[id number]]  (Normal) Collected:  07/12/20 0151    Lab Status:  Final result Specimen:  Blood from Arm, Right Updated:  07/12/20 0233     PTT 23 seconds     Protime-INR [[id number]]  (Normal) Collected:  07/12/20 0151    Lab Status:  Final result Specimen:  Blood from Arm, Right Updated:  07/12/20 0233     Protime 14 2 seconds      INR 6 96    Basic metabolic panel [[id number]]  (Abnormal) Collected:  07/12/20 0151    Lab Status:  Final result Specimen:  Blood from Arm, Right Updated:  07/12/20 0232     Sodium 137 mmol/L      Potassium 3 2 mmol/L      Chloride 102 mmol/L      CO2 20 mmol/L      ANION GAP 15 mmol/L      BUN 12 mg/dL      Creatinine 0 92 mg/dL      Glucose 98 mg/dL      Calcium 9 0 mg/dL      eGFR 88 ml/min/1 73sq m     Narrative:       [place] guidelines for Chronic Kidney Disease (CKD):     Stage 1 with normal or high GFR (GFR > 90 mL/min/1 73 square meters)    Stage 2 Mild CKD (GFR = 60-89 mL/min/1 73 square meters)    Stage 3A Moderate CKD (GFR = 45-59 mL/min/1 73 square meters)    Stage 3B Moderate CKD (GFR = 30-44 mL/min/1 73 square meters)    Stage 4 Severe CKD (GFR = 15-29 mL/min/1 73 square meters)    Stage 5 End Stage CKD (GFR <15 mL/min/1 73 square meters)  Note: GFR calculation is accurate only with a steady state creatinine    CBC and differential [[id number]]  (Abnormal) Collected:  07/12/20 0151    Lab Status:  Final result Specimen:  Blood from Arm, Right Updated:  07/12/20 0202     WBC 9 62 Thousand/uL      RBC 3 78 Million/uL      Hemoglobin 11 5 g/dL      Hematocrit 34 1 %      MCV 90 fL      MCH 30 4 pg      MCHC 33 7 g/dL      RDW 12 6 %      MPV 11 0 fL      Platelets 933 Thousands/uL      nRBC 0 /100 WBCs      Neutrophils Relative 67 %      Immat GRANS % 0 %      Lymphocytes Relative 25 %      Monocytes Relative 7 %      Eosinophils Relative 1 %      Basophils Relative 0 %      Neutrophils Absolute 6 36 Thousands/µL      Immature Grans Absolute 0 02 Thousand/uL      Lymphocytes Absolute 2 42 Thousands/µL      Monocytes Absolute 0 68 Thousand/µL      Eosinophils Absolute 0 10 Thousand/µL      Basophils Absolute 0 04 Thousands/µL     POCT pregnancy, urine [[id number]]     Lab Status:  No result                  Imaging Studies:   Direct to CT:  Yes  XR ankle 3+ views LEFT    (Results Pending)         Procedures  Procedures         ED Course  ED Course as of Jul 12 0409   Sun Jul 12, 2020   0147 Tourniquet applied for approximately 20 minutes 10 w/ active bleeding, 5 w/ tighter tourniquet, was able to identify an arterial bleeder distal aspect of the laceration, the bleeding stopped currently, patient did have a vasovagal episode or she felt shaky, lightheaded, improved with laying down, patient started on fluids      0148 The foot remains neurovascularly intact with intact sensation, dopplerable DP      0201 Spoke to Dr Marta Jernigan, will transfer to [facility] for trauma evaluation              MDM  Number of Diagnoses or Management Options  Laceration of ankle:   Diagnosis management comments: Patient with isolated actively bleeding laceration to foot, no chest wall trauma, no complaints, CTA b/l pulseox 99%  cxr deferred at this time    Diagnosis management comments: 52-year-old female with deep laceration of the anterior aspect of the left foot, arterial bleeding noted, able to stop the arterial bleed after ligating the small artery in the distal aspect of the laceration, given the degree of laceration including muscle involvement and vascular damage patient will be transferred to [facility] to trauma service             Disposition  [facility] Transfer: No  Final diagnoses:   Laceration of ankle     Time reflects when diagnosis was documented in both MDM as applicable and the Disposition within this note     Time User Action Codes Description Comment    7/12/2020  1:57 AM Raul Montserrat Add [S91 319A] Laceration of foot     7/12/2020  1:58 AM Raul Montserrat Add [R58] Mesenteric arterial bleeding     7/12/2020  1:58 AM Raul Montserrat Remove [R58] Mesenteric arterial bleeding     7/12/2020  1:58 AM Raul Montserrat Remove [C22 989I] Laceration of foot     7/12/2020  1:58 AM Raul Montserrat Add [S91 019A] Laceration of ankle       ED Disposition     ED Disposition Condition Date/Time Comment    Transfer to Another Facility-In [facility] Jul 12, 2020  1:57 AM Zoraida Hackett should be transferred out to [facility], Dr Merle Silva MD Documentation      Most Recent Value   Patient Condition  The patient has been stabilized such that within reasonable medical probability, no material deterioration of the patient condition or the condition of the unborn child(mateus) is likely to result from the transfer   Reason for Transfer  Level of Care needed not available at this facility [Trauma evaluation]   Benefits of Transfer  Specialized equipment and/or services available at the receiving facility (Include comment)________________________   Risks of Transfer  Potential for delay in receiving treatment, Potential deterioration of medical condition, Loss of IV, Increased discomfort during transfer   Accepting Physician  [address] Name, Nicky   [facility] ER   Sending MD Riccardo Palumbo   Provider Certification General risk, such as traffic hazards, adverse weather conditions, rough terrain or turbulence, possible failure of equipment (including vehicle or aircraft), or consequences of actions of persons outside the control of the transport personnel, Unanticipated needs of medical equipment and personnel during transport, Risk of worsening condition, The possibility of a transport vehicle being unavailable      RN Documentation      [address] Name, [address]   [facility] ER   Bed Assignment  ER TBD   Report Given to  [facility] RN       Follow-up Information    None       There are no discharge medications for this patient  No discharge procedures on file      PDMP Review     None          ED Provider  Electronically Signed by         Lulu Faulkner MD  07/12/20 4552

## 2020-07-12 NOTE — PLAN OF CARE
Problem: PHYSICAL THERAPY ADULT  Goal: Performs mobility at highest level of function for planned discharge setting  See evaluation for individualized goals  Description  Treatment/Interventions: Functional transfer training, Elevations, Therapeutic exercise, Endurance training, Equipment eval/education, Gait training, Spoke to nursing, OT  Equipment Recommended: (TBD; will trial crutches next session)       See flowsheet documentation for full assessment, interventions and recommendations  Note:   Prognosis: Good  Problem List: Decreased strength, Orthopedic restrictions, Decreased mobility  Assessment: Pt is 22 y o  female admitted with hx of mechanical tripping injury and c/o (L) ankle pain and inability to WB  Pt was Dx w/ laceration of left ankle and possible tendon injury; pt underwent laceration closure w/ sutures and posterior slab splint application by orthopedics; pt is NWB on (L) LE  Pt 's personal factors comorbidities affecting POC include CARLOS and steps in the house  Pt's clinical presentation is currently evolving which is evident in intermittent (L) ankle discomfort, ongoing Abx infusion, and abn lab values  Pt presents w/ min decreased amb balance, min gait dysfunction likely in light of WB restrictions and some fatigue (able to maintain NWB on (L) LE w/ trial of rw --> will introduce crutches next session), and decreased awareness of proper techniques during mobilization while remaining NWB on (L) LE (education provided)  Will cont to follow pt in PT for at least one more session to progress to using the crutches and for steps/curb negotiation training to achieve (I) functional status w/ all mobility skills prior to returning home w/ family support; will follow  Barriers to Discharge: Inaccessible home environment(steps in the house)     PT Discharge Recommendation: Return to previous environment with social support          See flowsheet documentation for full assessment

## 2020-07-12 NOTE — ED PROVIDER NOTES
Emergency Department Trauma Note  Feng Rondon 22 y o  female MRN: [id number]  Unit/Bed#: ED 14/ED 14 Encounter: [id number]      Trauma Alert: Trauma Acuity: Trauma Transfer  Model of Arrival: Mode of Arrival: ALS via Trauma Squad Name and Number: [facility]  Trauma Team: Current Providers  No providers found  Consultants: None      History of Present Illness     Chief Complaint:   Chief Complaint   Patient presents with    Trauma     Trauma transfer from [facility]     HPI:  Feng Rondon is a 22 y o  female who presents with Laceration of foot  Mechanism:Details of Incident: Patient kicked a fish tank with foot Injury Date: 07/12/20 Injury Time: Ras Guzmán note this trauma note pertains to the encounter at [facility]    25-year-old female with no significant past medical history presents for evaluation of laceration to the left foot after cutting it on broken fishtank  She does admit to drinking some alcohol this evening though appears to be clinically sober  During the injury she did not fall, did not lose consciousness  Initially felt some numbness of her foot however that is now resolving  On EMS arrival they noted a large laceration on the lateral aspect of the right ankle, oozing some blood    Bleeding stopped with pressure prior to arrival   She arrives in a pressure dressing, neurovascularly intact         Initially there was no active bleeding however once I attempted to clean the laceration there was noted to be a briskly bleeding arterial injury it and the distal aspect of the foot, I was able to place a tourniquet of the foot, the tourniquet was present for approximately 15 minutes and I was able to ligate the arterial bleed, the foot remained neurovascularly intact however was somewhat cool, there were dopplerable DP pulses intact sensation and intact motor the distal foot given the depth and extent of the laceration as well as the arterial injury I spoke to   Lucina Kimbrough at [facility] who accepted patient for transfer and trauma evaluation, patient currently not actively bleeding after pressure dressing was applied to the open laceration, patient will be given Ancef, tetanus update and patient will be transferred to [facility] for trauma evaluation        Review of Systems   Constitutional: Negative for appetite change and fever  HENT: Negative for rhinorrhea and sore throat  Eyes: Negative for photophobia and visual disturbance  Respiratory: Negative for cough, chest tightness and wheezing  Cardiovascular: Negative for chest pain, palpitations and leg swelling  Gastrointestinal: Negative for abdominal distention, abdominal pain, blood in stool, constipation and diarrhea  Genitourinary: Negative for dysuria, flank pain, frequency, hematuria and urgency  Musculoskeletal: Negative for back pain  Skin: Positive for wound  Negative for rash  Neurological: Negative for dizziness, weakness and headaches  All other systems reviewed and are negative  Historical Information     Immunizations:   Immunization History   Administered Date(s) Administered    Tdap 07/12/2020       History reviewed  No pertinent past medical history  History reviewed  No pertinent family history  History reviewed  No pertinent surgical history    Social History     Tobacco Use    Smoking status: Never Smoker    Smokeless tobacco: Never Used   Substance Use Topics    Alcohol use: Yes     Comment: patient report having 6 shots tonight    Drug use: Yes     Types: Marijuana     E-Cigarette/Vaping     E-Cigarette/Vaping Substances       Family History: non-contributory    Meds/Allergies   None       No Known Allergies    PHYSICAL EXAM    PE limited by: None    Objective   Vitals:   First set: Temperature: 99 8 °F (37 7 °C) (07/12/20 0315)  Pulse: 90 (07/12/20 0315)  Respirations: 20 (07/12/20 0315)  Blood Pressure: 136/70 (07/12/20 0315)  SpO2: 100 % (07/12/20 9135)    Primary Survey:   (A) Airway: Intact  (B) Breathing: CTA b/l  (C) Circulation: Pulses:   normal  (D) Disabliity:  GCS Total:  15  (E) Expose:  Completed    Secondary Survey: (Click on Physical Exam tab above)  Physical Exam   Constitutional: She is oriented to person, place, and time  She appears well-developed and well-nourished  HENT:   Head: Normocephalic and atraumatic  Eyes: Pupils are equal, round, and reactive to light  EOM are normal    Neck: Normal range of motion  Neck supple  Cardiovascular: Normal rate and regular rhythm  Exam reveals no gallop and no friction rub  No murmur heard  Pulmonary/Chest: Effort normal  She has no wheezes  She has no rales  She exhibits no tenderness  Abdominal: Soft  She exhibits no distension and no mass  There is no rebound and no guarding  Musculoskeletal:   Deep laceration to the anterior aspect of the left foot , involving muscle    When I was cleaning a laceration, the laceration started with a brisk arterial bleeding from the distal aspect of the laceration pressure immediately applied   Neurological: She is alert and oriented to person, place, and time  Skin: Skin is warm and dry  Psychiatric: She has a normal mood and affect  Nursing note and vitals reviewed        Invasive Devices     Peripheral Intravenous Line            Peripheral IV 07/12/20 Right Antecubital less than 1 day                Lab Results:   Results Reviewed     None                 Imaging Studies:   Direct to CT: Yes  No orders to display   Patient with isolated actively bleeding laceration to foot, no chest wall trauma, no complaints, CTA b/l pulseox 99%  cxr deferred at this time      Procedures  Procedures            ED Course           MDM  Number of Diagnoses or Management Options  Diagnosis management comments: 81-year-old female with deep laceration of the anterior aspect of the left foot, arterial bleeding noted, able to stop the arterial bleed after ligating the small artery in the distal aspect of the laceration, given the degree of laceration including muscle involvement and vascular damage patient will be transferred to [facility] to trauma service              Disposition  [facility] Transfer: No  Final diagnoses:   None     ED Disposition     None      Follow-up Information    None       Patient's Medications    No medications on file     No discharge procedures on file      PDMP Review     None          ED Provider  Electronically Signed by         Ck Yan MD  07/12/20 3040       Ck Yan MD  07/12/20 9730

## 2020-07-12 NOTE — ASSESSMENT & PLAN NOTE
Ortho consulted , seen and are following patient  Splint to Left ankle  NWB LLE  Antibiotics, initiated inpatient and will follow up as outpatient  Call Ortho to be seen in [address] [place] [address] office on Monday or Tuesday  Pain management

## 2020-07-12 NOTE — PLAN OF CARE
Problem: PHYSICAL THERAPY ADULT  Goal: Performs mobility at highest level of function for planned discharge setting  See evaluation for individualized goals  Description  Treatment/Interventions: Functional transfer training, Elevations, Therapeutic exercise, Endurance training, Equipment eval/education, Gait training, Spoke to nursing, OT  Equipment Recommended: (TBD; will trial crutches next session)       See flowsheet documentation for full assessment, interventions and recommendations  7/12/2020 1537 by Ki Villegas PT  Outcome: Adequate for Discharge  Note:   Prognosis: Good  Problem List: Decreased strength, Orthopedic restrictions, Decreased mobility  Assessment: Additional follow up consecutive session performed to progress to use of ax crutches and for steps/curb negotiation/ training prior to D/C home; pt was able to demonstrate mod (I) level w/ all aspects of observed mobility on level surfaces and (S)/mod (I) level on the curb/steps w/ use of ax crutches and no excessive fatigue or increased discomfort expressed; when negotiating steps down, decreased control of (R) foot placement noted on the 1st step down --> education provided; pt denies any discomfort at that point or post mobilization; (R) heel was inspected after returning back to bed -> no tenderness, erythema or ecchymosis noted; pt was fitted and provided w/ a pair of ax crutches to use at home; pt expressed no concerns about returning home when medically cleared; no other immediate PT needs otherwise identified; will sign off;  Barriers to Discharge: None     PT Discharge Recommendation: Return to previous environment with social support          See flowsheet documentation for full assessment

## 2020-07-12 NOTE — H&P
H&P Exam - Trauma   [place] Lew 22 y o  female MRN: [id number]  Unit/Bed#: ED 14 Encounter: [id number]    Assessment/Plan   Trauma Alert:  Transfer  Model of Arrival: Ambulance  Trauma Team: Dieter Rod  Consultants: Ortho, PT, OT    Trauma Active Problems:   -left lateral anterior ankle open laceration, possible tendinous injury       Dispo: Likely discharge home tomorrow, obs admission     Trauma Plan:   -Ortho consult:  · NWB left lower extremity in posterior slab splint  · Joint challenge negative at 50 cc  Laceration closed loosely with running 3-0 Nylon sutures  · FU with Dr Omari Wade next week for further evaluation  · Analgesics for pain  · Would give antibiotics for 1 week with 3rd or 4th generation cephalosporin or flouroquinoline  · Tetanus updated  -PT, OT to see  -Likely discharge home tomorrow    Chief Complaint: Left ankle injury     History of Present Illness   HPI:  Surinder San is a 22 y o  female not on anticoagulation who presents as a trauma transfer after she sustained an open laceration to her left anterior lateral ankle  Was drinking today, went into a dark basement and somehow stepped on a fish tank, the glass broke and lacerated her ankle  Neurovascularly intact pre transfer  No other injuries noted  Did not hit her head or lose consciousness  Patient's bleeding was able to be controlled at outside hospital, transferred in a pressure dressing  States that now with a pressure dressing removed, she feels like the sensation is coming back in her foot  12-point, complete review of systems was reviewed and negative except as stated above  History reviewed  No pertinent past medical history  History reviewed  No pertinent surgical history    Social History   Social History     Substance and Sexual Activity   Alcohol Use Yes    Comment: patient report having 6 shots tonight     Social History     Substance and Sexual Activity   Drug Use Yes    Types: Marijuana     Social History     Tobacco Use   Smoking Status Never Smoker   Smokeless Tobacco Never Used     E-Cigarette/Vaping     E-Cigarette/Vaping Substances     Immunization History   Administered Date(s) Administered    Tdap 07/12/2020     Last Tetanus:  Updated today  Family History: Non-contributory        Meds/Allergies   current meds:   Current Facility-Administered Medications   Medication Dose Route Frequency    acetaminophen (TYLENOL) tablet 975 mg  975 mg Oral Q8H [address]    cefepime (MAXIPIME) 2 g/50 mL dextrose IVPB  2,000 mg Intravenous Once    docusate sodium (COLACE) capsule 100 mg  100 mg Oral BID    levofloxacin (LEVAQUIN) tablet 750 mg  750 mg Oral Q24H    ondansetron (ZOFRAN) injection 4 mg  4 mg Intravenous Q6H PRN    oxyCODONE (ROXICODONE) immediate release tablet 10 mg  10 mg Oral Q4H PRN    oxyCODONE (ROXICODONE) IR tablet 5 mg  5 mg Oral Q4H PRN    potassium chloride (K-DUR,KLOR-CON) CR tablet 40 mEq  40 mEq Oral Once       No Known Allergies      PHYSICAL EXAM        Objective   Vitals:   First set: Temperature: 99 8 °F (37 7 °C) (07/12/20 0315)  Pulse: 90 (07/12/20 0315)  Respirations: 20 (07/12/20 0315)  Blood Pressure: 136/70 (07/12/20 0315)    Primary Survey:   (A) Airway:  Intact  (B) Breathing:  Bilateral breath sounds  (C) Circulation: Pulses:   Normal, palpable DP and PT pulses at the left foot  (D) Disabliity:  GCS Total:  15  (E) Expose:  Completed    Secondary Survey: (Click on Physical Exam tab above)  Physical Exam   Constitutional: She is oriented to person, place, and time  She appears well-developed and well-nourished  No distress  HENT:   Head: Normocephalic and atraumatic  Eyes: Pupils are equal, round, and reactive to light  Conjunctivae are normal    Neck: Normal range of motion  Cardiovascular: Normal rate, regular rhythm and normal heart sounds  No murmur heard  Pulmonary/Chest: Effort normal and breath sounds normal  No respiratory distress   She has no wheezes  Abdominal: Soft  Bowel sounds are normal  There is no tenderness  Musculoskeletal: Normal range of motion  Neurological: She is alert and oriented to person, place, and time  No cranial nerve deficit or sensory deficit  Endorses sensation in her distal left lower extremity  Is able to move slightly, however motor exam is limited by significant pain with movement  After removing tourniquet, foot appears to read perfuse well  Skin: Skin is warm and dry  No rash noted  Left anterior lateral approximately 6 cm in length open laceration with some active oozing  Psychiatric: She has a normal mood and affect  Nursing note and vitals reviewed  Invasive Devices     Peripheral Intravenous Line            Peripheral IV 07/12/20 Right Antecubital less than 1 day                Lab Results:   BMP/CMP:   Lab Results   Component Value Date    SODIUM 137 07/12/2020    K 3 2 (L) 07/12/2020     07/12/2020    CO2 20 (L) 07/12/2020    BUN 12 07/12/2020    CREATININE 0 92 07/12/2020    CALCIUM 9 0 07/12/2020    EGFR 88 07/12/2020    and CBC:   Lab Results   Component Value Date    WBC 9 62 07/12/2020    HGB 11 5 07/12/2020    HCT 34 1 (L) 07/12/2020    MCV 90 07/12/2020     07/12/2020    MCH 30 4 07/12/2020    MCHC 33 7 07/12/2020    RDW 12 6 07/12/2020    MPV 11 0 07/12/2020    NRBC 0 07/12/2020     Imaging/EKG Studies:   Left Ankle XR:   There is no acute fracture or dislocation  Ankle mortise appears intact      No significant degenerative changes      No suspicious appearing osseous lesions are seen      Overlying bandage is seen in the distal leg and anterior lateral ankle  No discrete radiopaque foreign body is seen         IMPRESSION:     Overlying bandage as described, but no acute osseous abnormality is seen  No discrete radiopaque foreign body is seen        Code Status: Level 1 - Full Code  Advance Directive and Living Will:      Power of :    POLST:

## 2020-07-12 NOTE — PHYSICAL THERAPY NOTE
Physical Therapy Evaluation     Patient's Name: Vanessa Wright    Admitting Diagnosis  Foot injury [J62 568T]  Laceration of left ankle, initial encounter [S91 012A]    Problem List  Patient Active Problem List   Diagnosis    Laceration of left ankle       Past Medical History  History reviewed  No pertinent past medical history  Past Surgical History  History reviewed  No pertinent surgical history  07/12/20 1035   Note Type   Note type Eval/Treat   Pain Assessment   Pain Assessment Tool 0-10   Pain Score No Pain  (at rest)   Pain Location/Orientation Orientation: Left; Other (Comment)  (ankle)   Pain Onset/Description Onset: Ongoing;Frequency: Intermittent; Descriptor: Aching;Descriptor: Discomfort  (w/ LE on more dependent position)   Effect of Pain on Daily Activities some throbbing post mobilization   Patient's Stated Pain Goal No pain   Hospital Pain Intervention(s) Repositioned;Elevated; Emotional support   Home Living   Type of [address] [place] [address] Two level; Able to live on main level with bedroom/bathroom  (1+ 1 CARLOS and flight of steps to 2nd floor w/ hand rail)   Prior Function   Level of [place] Independent with ADLs and functional mobility  (amb w/o AD; reports she used crutches in the past)   Lives With Family  (father)   Restrictions/Precautions   LLE Weight Bearing Per Order NWB  (per ortho notes; pt is aware)   Braces or Orthoses Splint  (posterior slab splint (L) ankle)   Other Precautions Multiple lines;WBS  (IV Abx running (reports just started))   General   Additional Pertinent History Cleared for assessment/mobilization (spoke to RN who also informs pt may be D/C today)   Cognition   Overall Cognitive Status WFL   Arousal/Participation Alert   Attention Within functional limits   Orientation Level Oriented to person;Oriented to place;Oriented to situation   Memory Within functional limits   Following Commands Follows all commands and directions without difficulty Comments Pt is observed in bed; somewhat flat affect; agreeable to try mobilization;   RUE Assessment   RUE Assessment WFL  (AROM)   LUE Assessment   LUE Assessment WFL  (AROM)   RLE Assessment   RLE Assessment WFL  (AROM)   Strength RLE   RLE Overall Strength   (good/ good + (grossly))   LLE Assessment   LLE Assessment X  (ankle not tested (immobilized))   Strength LLE   LLE Overall Strength   (At least fair hip and knee; ankle not tested)   Bed Mobility   Supine to Sit 6  Modified independent   Sit to Supine 6  Modified independent   Transfers   Sit to Stand 6  Modified independent   Additional items Impulsive;Verbal cues  (w/ rw)   Stand to Sit 6  Modified independent   Additional items Verbal cues   Ambulation/Elevation   Gait pattern   (hop-to gait pattern)   Gait Assistance 5  Supervision  (mainly for IV management)   Additional items Assist x 1;Verbal cues  (reviewed precautions)   Assistive Device Rolling walker  (will introduce crutches next session)   Distance 20 ft   Balance   Static Sitting Good   Dynamic Sitting Fair +   Static Standing Fair +   Ambulatory Fair   Activity Tolerance   Activity Tolerance Patient tolerated treatment well  (some discomfort in the (L) ankle-->subsided w/ LE elevation)   Nurse Made Aware spoke to Rock Kar RN   Assessment   Prognosis Good   Problem List Decreased strength;Orthopedic restrictions;Decreased mobility   Assessment Pt is 22 y o  female admitted with hx of mechanical tripping injury and c/o (L) ankle pain and inability to WB  Pt was Dx w/ laceration of left ankle and possible tendon injury; pt underwent laceration closure w/ sutures and posterior slab splint application by orthopedics; pt is NWB on (L) LE  Pt 's personal factors comorbidities affecting POC include CARLOS and steps in the house  Pt's clinical presentation is currently evolving which is evident in intermittent (L) ankle discomfort, ongoing Abx infusion, and abn lab values   Pt presents w/ min decreased amb balance, min gait dysfunction likely in light of WB restrictions and some fatigue (able to maintain NWB on (L) LE w/ trial of rw --> will introduce crutches next session), and decreased awareness of proper techniques during mobilization while remaining NWB on (L) LE (education provided)  Will cont to follow pt in PT for at least one more session to progress to using the crutches and for steps/curb negotiation training to achieve (I) functional status w/ all mobility skills prior to returning home w/ family support; will follow  Barriers to Discharge Inaccessible home environment  (steps in the house)   Goals   Patient Goals to go home   STG Expiration Date 07/14/20   Short Term Goal #1 1-2 days  Pt will amb 2 x 50 ft w/ ax crutches, NWB (L) LE, mod (I) in order to facilitate safe return to premorbid environment and at least household amb status  Pt will negotiate 1 step/curb x 2 trials w/ ax crutches, mod (I) and 12 steps w/ hand rail and ax crutch, NWB (L) LE, mod (I) in order to assure safe navigation in and out of the premorbid living environment and between the levels of the house  Pt will perform transfers w/ proper use of ax crutches, NWB (L) LE, mod (I) to assure (I) and safety w/ functional mobility/transitions w/ all aspects of mobility/locomotion  PT Treatment Day 0   Plan   Treatment/Interventions Functional transfer training;Elevations; Therapeutic exercise; Endurance training;Equipment eval/education;Gait training;Spoke to nursing;OT   PT Frequency 1-2x/wk   Recommendation   PT Discharge Recommendation Return to previous environment with social support   Equipment Recommended   (TBD; will trial crutches next session)   Modified Nick Scale   Modified Van Zandt Scale 3         Kassi Cherry, PT

## 2020-07-12 NOTE — DISCHARGE INSTRUCTIONS
Laceration   WHAT YOU NEED TO KNOW:   A laceration is an injury to the skin and the soft tissue underneath it  Lacerations happen when you are cut or hit by something  They can happen anywhere on the body  DISCHARGE INSTRUCTIONS:   Return to the emergency department if:   · You have heavy bleeding or bleeding that does not stop after 10 minutes of holding firm, direct pressure over the wound  · Your wound opens up  Contact your healthcare provider if:   · You have a fever or chills  · Your laceration is red, warm, or swollen  · You have red streaks on your skin coming from your wound  · You have white or yellow drainage from the wound that smells bad  · You have pain that gets worse, even after treatment  · You have questions or concerns about your condition or care  Medicines:   · Prescription pain medicine  may be given  Ask how to take this medicine safely  · Antibiotics  help treat or prevent a bacterial infection  · Take your medicine as directed  Contact your healthcare provider if you think your medicine is not helping or if you have side effects  Tell him or her if you are allergic to any medicine  Keep a list of the medicines, vitamins, and herbs you take  Include the amounts, and when and why you take them  Bring the list or the pill bottles to follow-up visits  Carry your medicine list with you in case of an emergency  Care for your wound as directed:   · Do not get your wound wet  until your healthcare provider says it is okay  Do not soak your wound in water  Do not go swimming until your healthcare provider says it is okay  Carefully wash the wound with soap and water  Gently pat the area dry or allow it to air dry  · Change your bandages  when they get wet, dirty, or after washing  Apply new, clean bandages as directed  Do not apply elastic bandages or tape too tight  Do not put powders or lotions over your incision  · Apply antibiotic ointment as directed  Your healthcare provider may give you antibiotic ointment to put over your wound if you have stitches  If you have strips of tape over your incision, let them dry up and fall off on their own  If they do not fall off within 14 days, gently remove them  If you have glue over your wound, do not remove or pick at it  If your glue comes off, do not replace it with glue that you have at home  · Check your wound every day for signs of infection such as swelling, redness, or pus  Self-care:   · Apply ice  on your wound for 15 to 20 minutes every hour or as directed  Use an ice pack, or put crushed ice in a plastic bag  Cover it with a towel  Ice helps prevent tissue damage and decreases swelling and pain  · Use a splint as directed  A splint will decrease movement and stress on your wound  It may help it heal faster  A splint may be used for lacerations over joints or areas of your body that bend  Ask your healthcare provider how to apply and remove a splint  · Decrease scarring of your wound  by applying ointments as directed  Do not apply ointments until your healthcare provider says it is okay  You may need to wait until your wound is healed  Ask which ointment to buy and how often to use it  After your wound is healed, use sunscreen over the area when you are out in the sun  You should do this for at least 6 months to 1 year after your injury  Follow up with your healthcare provider as directed: You may need to follow up in 24 to 48 hours to have your wound checked for infection  You will need to return in 3 to 14 days if you have stitches or staples so they can be removed  Care for your wound as directed to prevent infection and help it heal  Write down your questions so you remember to ask them during your visits  © 2017 [employer] Information is for End User's use only and may not be sold, redistributed or otherwise used for commercial purposes   All illustrations and images included in [facility] are the copyrighted property of A [employer]  or [employer]  The above information is an  only  It is not intended as medical advice for individual conditions or treatments  Talk to your doctor, nurse or pharmacist before following any medical regimen to see if it is safe and effective for you

## 2020-07-14 ENCOUNTER — ANESTHESIA EVENT (OUTPATIENT)
Dept: PERIOP | Facility: HOSPITAL | Age: 25
End: 2020-07-14
Payer: COMMERCIAL

## 2020-07-14 ENCOUNTER — OFFICE VISIT (OUTPATIENT)
Dept: OBGYN CLINIC | Facility: CLINIC | Age: 25
End: 2020-07-14
Payer: COMMERCIAL

## 2020-07-14 VITALS — BODY MASS INDEX: 24.17 KG/M2 | WEIGHT: 154 LBS | HEIGHT: 67 IN

## 2020-07-14 VITALS
SYSTOLIC BLOOD PRESSURE: 122 MMHG | HEIGHT: 67 IN | WEIGHT: 154 LBS | TEMPERATURE: 98.9 F | BODY MASS INDEX: 24.17 KG/M2 | DIASTOLIC BLOOD PRESSURE: 80 MMHG

## 2020-07-14 DIAGNOSIS — S96.122A LACERATION OF MUSCLE AND TENDON OF LONG EXTENSOR MUSCLE OF TOE AT ANKLE AND FOOT LEVEL, LEFT FOOT, INITIAL ENCOUNTER: Primary | ICD-10-CM

## 2020-07-14 DIAGNOSIS — S96.122A LACERATION OF MUSCLE AND TENDON OF LONG EXTENSOR MUSCLE OF TOE AT ANKLE AND FOOT LEVEL, LEFT FOOT, INITIAL ENCOUNTER: ICD-10-CM

## 2020-07-14 DIAGNOSIS — S91.012A LACERATION OF LEFT ANKLE, INITIAL ENCOUNTER: Primary | ICD-10-CM

## 2020-07-14 DIAGNOSIS — S91.012A LACERATION OF LEFT ANKLE, INITIAL ENCOUNTER: ICD-10-CM

## 2020-07-14 PROCEDURE — 99203 OFFICE O/P NEW LOW 30 MIN: CPT | Performed by: ORTHOPAEDIC SURGERY

## 2020-07-14 RX ORDER — CHLORHEXIDINE GLUCONATE 4 G/100ML
SOLUTION TOPICAL DAILY PRN
Status: CANCELLED | OUTPATIENT
Start: 2020-07-14

## 2020-07-14 RX ORDER — CEFAZOLIN SODIUM 1 G/50ML
1000 SOLUTION INTRAVENOUS ONCE
Status: CANCELLED | OUTPATIENT
Start: 2020-07-16 | End: 2020-07-14

## 2020-07-14 RX ORDER — NORGESTIMATE AND ETHINYL ESTRADIOL 7DAYSX3 28
1 KIT ORAL DAILY
COMMUNITY
Start: 2020-04-27

## 2020-07-14 NOTE — PROGRESS NOTES
Assessment:     1  Laceration of left ankle, initial encounter    2  Laceration of muscle and tendon of long extensor muscle of toe at ankle and foot level, left foot, initial encounter        Plan:     Problem List Items Addressed This Visit        Musculoskeletal and Integument    Laceration of muscle and tendon of long extensor muscle of toe at ankle and foot level, left foot, initial encounter       Other    Laceration of left ankle - Primary          The patient was seen and examined by Dr Indira Luna and myself  Findings consistent with left ankle laceration with involvement of extensor tendon  Findings and treatment options were discussed with the patient and her father  Patient is unable to actively dorsiflex the left ankle  Recommend consultation with Dr Miya Calix the foot ankle specialist for surgical planning  She was placed in a short-leg posterior splint  Continue antibiotics as prescribed until completion  Continue ice, elevation and NSAIDs as needed  All questions were answered to their satisfaction  Subjective:     Patient ID: Gomez Forde is a 22 y o  female  Chief Complaint: This is a 50-year-old white female who injured her left ankle on July 12, 2020  Patient was intoxicated and was walking in the dark and stepped into a fish tank  The glass from the fish tank caused a laceration to the anterior lateral ankle  She was brought to the emergency room and was found to have a small arterial bleeder was ligated  She was then transferred to the [facility] and seen by Orthopedics there  Saline was injected into the ankle joint that did not leak out  The laceration was loosely closed with nylon  She was placed in a posterior splint  She was prescribed p o  Levaquin that she is taking as directed  It was recommended that she follow up with the foot and ankle surgeon Dr Miya Calix  Her pain is well controlled  She is using crutches  She denies any fevers or chills    She states she has a history of fracture in the ankle when she was a child that was treated with a cast at that time  Patient intake form was reviewed today  Allergy:  No Known Allergies  Medications:  all current active meds have been reviewed  Past Medical History:  History reviewed  No pertinent past medical history  Past Surgical History:  History reviewed  No pertinent surgical history  Family History:  Family History   Problem Relation Age of Onset    Diabetes Mother     Hypertension Mother     No Known Problems Father      Social History:  Social History     Substance and Sexual Activity   Alcohol Use Yes    Comment: patient report having 6 shots tonight     Social History     Substance and Sexual Activity   Drug Use Yes    Types: Marijuana     Social History     Tobacco Use   Smoking Status Never Smoker   Smokeless Tobacco Never Used     Review of Systems   Constitutional: Negative  HENT: Negative  Eyes: Negative  Respiratory: Negative  Cardiovascular: Negative  Gastrointestinal: Negative  Endocrine: Negative  Genitourinary: Negative  Musculoskeletal: Positive for arthralgias, gait problem (using crutches) and joint swelling  Skin: Negative  Allergic/Immunologic: Negative  Hematological: Negative  Psychiatric/Behavioral: Negative  Objective:  BP Readings from Last 1 Encounters:   07/14/20 122/80      Wt Readings from Last 1 Encounters:   07/14/20 69 9 kg (154 lb)      BMI:   Estimated body mass index is 24 12 kg/m² as calculated from the following:    Height as of this encounter: 5' 7" (1 702 m)  Weight as of this encounter: 69 9 kg (154 lb)  BSA:   Estimated body surface area is 1 81 meters squared as calculated from the following:    Height as of this encounter: 5' 7" (1 702 m)  Weight as of this encounter: 69 9 kg (154 lb)  Physical Exam   Constitutional: She is oriented to person, place, and time  She appears well-developed     HENT:   Head: Normocephalic and atraumatic  Eyes: Conjunctivae and EOM are normal    Neck: Neck supple  Pulmonary/Chest: Effort normal    Neurological: She is alert and oriented to person, place, and time  Skin: Skin is warm  Psychiatric: She has a normal mood and affect  Nursing note and vitals reviewed  Left Ankle Exam     Tenderness   Left ankle tenderness location: around laceration as expected  Swelling: moderate    Range of Motion   Dorsiflexion: abnormal   Plantar flexion: abnormal   Eversion: abnormal   Inversion: abnormal     Other   Erythema: absent  Scars: present (Horizontal laceration over taurus lateral aspect ankle at level of tibiotalar joint  Laceration closed loosely with nylon sutures  No active drainage )  Sensation: normal  Pulse: present    Comments:  Unable to actively dorsiflex ankle including EHL  DP palpated and 2+  Capillary refill brisk            I have personally reviewed pertinent films in PACS and my interpretation is X-rays of the left ankle reveal no osseous abnormalities

## 2020-07-14 NOTE — PROGRESS NOTES
ROXANA Castillo  Attending, Orthopaedic Surgery  [facility] [address] [facility]      ORTHOPAEDIC FOOT AND ANKLE CLINIC VISIT     Assessment:     Encounter Diagnoses   Name Primary?  Laceration of muscle and tendon of long extensor muscle of toe at ankle and foot level, left foot, initial encounter Yes    Laceration of left ankle, initial encounter             Plan:   · The patient verbalized understanding of exam findings and treatment plan  We engaged in the shared decision-making process and treatment options were discussed at length with the patient  Surgical and conservative management discussed today along with risks and benefits  · Given exam, patient has a high likelihood of anterior compartment tendon injury and possible deep peroneal nerve injury  · We will schedule the patient for I&D of Left ankle wound with exploration and possible tendon repair  · She is to take Lovenox 40 mg daily post-operatively for 30 days and follow up post-operatively  Return in about 3 weeks (around 8/4/2020)  CONSENT FOR SOFT TISSUE PROCEDURES:   Patient understands that there is no guarantee that the surgery will relieve all of their pain and also understands that there may be a prolonged course of protected weight-bearing status required which will restrict them from driving and other activities as discussed at today's visit  Patient recognizes that there are risks with surgery including bleeding, numbness, nerve irritation, wound complications, infection, continued pain, anesthetic complications, death, failure of procedure and possible need for further surgery  The patient understands that there is no guarantee that this surgery will relieve all of Her pain and symptoms  Patient understands that there is no guarantee that they will return to full function after the procedure  Patient has provided informed consent for the procedure           History of Present Illness:   Chief Complaint:   Chief Complaint   Patient presents with    Left Ankle - Pain     Courtney Michele is a 22 y o  female who is being seen for left ankle laceration  Patient notes on Saturday she kicked her foot into a fish tank in a dark basement while intoxicated, feeling immediate pain and cutting her lower leg  Pain is localized at ankle with minimal radiating and described as sharp and severe  She was seen in the ED and could not extend her ankle or toes on exam  She had decreased sensation in the deep peroneal nerve distribution as well  The joint was challenged which was negative and she was closed and splinted  Patient today denies numbness, tingling or radicular pain  Denies history of neuropathy  Patient does not smoke, does not have diabetes and does not take blood thinners  Patient denies family history of anesthesia complications and has not had any complications with anesthesia  Pain/symptom timing:  Worse during the day when active  Pain/symptom context:  Worse with activites and work  Pain/symptom modifying factors:  Rest makes better, activities make worse  Pain/symptom associated signs/symptoms: none    Prior treatment   · NSAIDsYes   · Injections No   · Bracing/Orthotics No    · Physical Therapy No     Orthopedic Surgical History:   See Below    Past Medical, Surgical and Social History:  Past Medical History:  has no past medical history on file  Problem List: does not have any pertinent problems on file  Past Surgical History:  has no past surgical history on file  Family History: family history includes Diabetes in her mother; Hypertension in her mother; No Known Problems in her father  Social History:  reports that she has never smoked  She has never used smokeless tobacco  She reports that she drinks alcohol  She reports that she has current or past drug history  Drug: Marijuana    Current Medications: has a current medication list which includes the following prescription(s): acetaminophen, levofloxacin, oxycodone, and tri femynor  Allergies: has No Known Allergies  Review of Systems:  General- denies fever/chills  HEENT- denies hearing loss or sore throat  Eyes- denies eye pain or visual disturbances, denies red eyes  Respiratory- denies cough or SOB  Cardio- denies chest pain or palpitations  GI- denies abdominal pain  Endocrine- denies urinary frequency  Urinary- denies pain with urination  Musculoskeletal- Negative except noted above  Skin- denies rashes or wounds  Neurological- denies dizziness or headache  Psychiatric- denies anxiety or difficulty concentrating    Physical Exam:   Ht 5' 7" (1 702 m)   Wt 69 9 kg (154 lb)   LMP 07/01/2020 (Exact Date)   BMI 24 12 kg/m²   General/Constitutional: No apparent distress: well-nourished and well developed  Eyes: normal ocular motion  Cardio: RRR, Normal S1S2, No m/r/g  Lymphatic: No appreciable lymphadenopathy  Respiratory: Non-labored breathing, CTA b/l no w/c/r  Vascular: No edema, swelling or tenderness, except as noted in detailed exam   Integumentary: No impressive skin lesions present, except as noted in detailed exam   Neuro: No ataxia or tremors noted  Psych: Normal mood and affect, oriented to person, place and time  Appropriate affect  Musculoskeletal: Normal, except as noted in detailed exam and in HPI  Examination    Left    Gait Ambulating using crutches   Musculoskeletal Tender to palpation at distal tibia and ankle    Skin Normal       Nails Normal    Range of Motion  Unable to dorsiflex ankle and toes, Normal degrees plantarflexion  Subtalar motion: Normal    Stability Stable    Muscle Strength 0/5 tibialis anterior  5/5 gastrocnemius-soleus  5/5 posterior tibialis  5/5 peroneal/eversion strength  0/5 EHL  0/5 EDC  5/5 FHL    Neurologic Normal    Sensation Decreased sensation in deep peroneal distribution  [place]-Allan 5 07 filament (10g) testing deferred      Cardiovascular Brisk capillary refill < 2 seconds,intact DP and PT pulses    Special Tests None      Imaging Studies:   3 views of the left ankle were taken, reviewed and interpreted independently that demonstrate no acute fractures or dislocations  Reviewed by me personally  Sherral Mires Lachman, MD  Foot & Ankle Surgery   Department [place] [address]      I personally performed the service  Sherral Mires Lachman, MD

## 2020-07-14 NOTE — PATIENT INSTRUCTIONS
ROXANA Diaz  Attending, [address] [place] [address] Office Phone: [phone] ? Fax: [phone]  Ella Lambert Office Phone: [phone] ? [facility]:[phone]    : Jessie Bah) [place], [place]    Surgery Coordinator [facility]: Gayathri Denise, [phone]  Surgery Coordinator Ella Lambert:  Juan Jose Hill, [phone]  www [email or web address]   PRE-OPERATIVE AND POST-OPERATIVE INSTRUCTIONS    General Information:   Typical post operative visits are at the following intervals:  2-3 weeks post surgery, 6 weeks post surgery, 3 months post surgery, 6 months post surgery, and then on a yearly basis  However, this may change based on Dr Aury Wang recommendation   #1 post-operative rule for foot/ankle surgery:  ONCE YOU ARE OUT OF YOUR CAST AND/OR REMOVABLE BOOT, SWELLING MAY PERSIST FOR MANY MONTHS  YOU MIGHT ALSO EXPERIENCE A BLUISH DISCOLORATION OF YOUR LEG  THIS IS NORMAL AND PART OF THE USUAL POSTOPERATIVE EXPERIENCE    DO NOT WAIT UNTIL YOUR BLOCK WEARS OFF TO TAKE YOUR PAIN MEDICATION  IT TAKES A FEW DOSES OF THE PAIN MEDICATION TO REACH A THERAPEUTIC LEVEL  TAKE A TABLET PROACTIVELY BEFORE YOU HAVE ANY PAIN AND AGAIN 4 HOURS LATER SO WHEN THE BLOCK WEARS OFF, YOU ARE NOT CAUGHT OFF GUARD  SMOKING:   Smoking results in incomplete healing of fractures (broken bones) and joints that my have been fused  Smoking and nicotine also prevents the growth of bone into ankle replacements and bone healing  It also slows the healing of muscles and skin (soft tissue)  Therefore, please do not have surgery if you continue to smoke  We reserve the right to cancel your surgery if we suspect that you are smoking  DO NOT use nicorette gum or other patches  Please find an alternative method to quit smoking before your surgery and do not restart after surgery to allow for healing  THREE RULES:    1   After surgery you will most likely be given the instructions KEEP YOUR TOES ABOVE YOUR NOSE    This means that you MUST have your feet elevated higher than your heart  Keeping your toes above your nose helps to heal the muscles and skin (soft tissues) by reducing swelling in your leg  This position also helps to prevent infection, and is very important in avoiding deep venous thrombosis (blood clots)  2  In order to keep the blood circulating in your legs and in order to avoid deep vein   thrombosis (blood clots), we ask patients to GET UP ONCE AN HOUR during the day  This means you should at least cross the room and come back  It does not mean you have to be up for long periods of time  In most cases we will not have people immediately put any weight on their operated part  This is important to prevent loosening of metal or other devices holding the bones together  It also prevents irritation of the soft tissues which can lead to prolonged healing  When we say get up once an hour, please walk, hop or move with an assisted device  This is important! 3  Do not do any excessive walking during the first few days after surgery  Recovering from surgery is a full-time task for the patient  Postoperative care is important to avoid irritating the skin incision, which can lead to infection  Please do not plan activities or go out of town for several weeks after surgery  If you are unsure about your future activities, please schedule surgery only when you know it is acceptable for you  Scheduling surgery and then canceling the date, prevents other people from having surgery on that date as it takes time to line everything up effectively  If you cancel your surgery the week of your planned surgery, we reserve the right to cancel all future surgical procedures  AFTER YOUR SURGERY:   Bleeding through the bandage almost always occurs  Do not let this alarm you    Simply add more gauze or a towel, call us, and come in for a dressing change  If you think it is excessive, contact us immediately or go to the local emergency room   Do not get the bandage wet  Showering is possible with plastic protectors  Be very careful, as the bathroom can be wet and slippery  If you do get your dressing wet, it should be changed immediately  Please contact us   ONCE YOUR ARE OUT OF YOUR CAST AND/OR REMOVABLE BOOT, SWELLING MAY PERSIST FOR MANY MONTHS  YOU MIGHT ALSO EXPERIENCE A BLUISH DISCOLORATION OF YOUR LEG  THIS IS NORMAL AND PART OF THE USUAL POSTOPERATIVE EXPERIENCE  WEARING COMPRESSION HOSE (ELASTIC STOCKINGS) CAN HELP AVOID SOME OF THIS SWELLING  DRESSING:   The purpose of the surgical dressing is to keep your wound and the surgical site protected from the environment  Most dressings contain splints, which help to hold your foot and ankle in a corrected position, and also allow the surgical site to heal properly  If you have a drain in place, this will need to be removed in 1-3 days after surgery  The time for the drain to be pulled will be written on your discharge instruction sheet  CAST  INSTRUCTIONS:  You may or may not get a cast following surgery  If you do, pay close attention to the following:     After application of a splint or cast, it is very important to elevate your leg for 24 to 72 hours  The injured area should be elevated well above the heart  Remember Toes above your Nose  Rest and elevation greatly reduce pain and speed the healing process by minimizing early swelling      CALL YOUR DOCTORS OFFICE OR VISIT LOCATION EMERGENCY ROOM IF YOU HAVE ANY OF THE FOLLOWING:     Significant increased pain, which may be caused by swelling, and the feeling that the splint or cast is too tight   Numbness and tingling in your hand or foot, which may be caused by too much pressure on the nerves   Burning and stinging, which may be caused by too much pressure on the skin   Excessive swelling below the cast, which may mean the cast is slowing your blood circulation   Loss of active movement of toes, which request an urgent evaluation   Loss of capillary refill  Pinch the tip of toes and shana the skin  Release pressure and if the skin does not return pink then call the office immediately  DO NOT GET YOUR CAST WET  Bacteria thrive in moist dark areas  We do not want this  If your cast becomes wet, return to the office and we will apply another one  PAIN AFTER SURGERY:  Narcotic pain medication can and will depress your respiratory system if taken in excess  The goal of pain management with narcotics is to be comfortable not pain free  If you take enough narcotics to be pain free then you run the risk of stopping breathing  If this happens, call 911 immediately!  Pain in the heel is often  caused by pressure from the weight of your foot on the bed  Make sure your heel is suspended off the bed by keeping a pillow underneath your calf not your knee  Medications: You will be given narcotic pain medication  Do NOT drive while taking narcotic medications  Medications such as Darvocet, Percocet, Vicoden or Tylenol #3, also contain acetaminophen (Tylenol)  Do not take acetaminophen or Tylenol from home when taking theses medications  When you fill your prescription, you may ask the pharmacist if your pain medication has acetaminophen/Tylenol in it  It is okay to take Tylenol with Oxycontin/Oxycodone  Unless you are allergic to aspirin or currently taking a blood thinner, Dr Vanesa Persaud patients are requested to take one 325 mg aspirin every 12 hours until you are back to walking normally after surgery (This can be up to 6 weeks)  Ecotrin (Enteric-coated aspirin) is more sensitive to the stomach and we recommend purchasing this instead of regular aspirin to minimize the risk of stomach irritation  Narcotic medications commonly cause nausea   Taking them with food will decrease this side effect  If you are having extreme nausea, please contact us for an alternative medication or for something that can be taken with this medication to decrease the nausea  Also, narcotic medications frequently cause constipation  An increase of fiber, fruits and vegetables in your diet may alleviate this problem, or if necessary, you may use an over-the-counter medication such as senekot, colace, or Fibercon for constipation problems  You should resume all medications you were taking prior to the surgery unless otherwise specified  If you had fracture surgery, bony surgery like an osteotomy or fusion, or a surgery that requires bone healing, you are advised to take Vitamin D and Calcium to improve healing potential   Vitamin D3 4000 units/day and Calcium 1200mg/day  These are over the counter medications so please pick them up at the pharmacy when you are picking up your prescriptions  Activity:   Because of your recent foot surgery, your activity level will decrease  You will need to elevate your foot ABOVE the level of your heart for a minimum of four days  The length of time necessary for the swelling to go down, and for your wounds to heal properly depends greatly on your efforts here  Elevation is extremely important to avoid compromising the blood supply to your foot  Remember when your foot is down it will swell, which will increase pain and slow healing  Wiggle your toes frequently if possible  If you go home with a regional block, (a type of anesthesia) the foot and leg will be numb  Think of ways to get into your house and around the house until the block wears off  Keep in mind that it may be a legal issue if you drive while in a cast or splint, especially when the splint is on the right foot  You may call the Department of Motor Vehicles to schedule a road test if you have adaptive equipment applied to your car     The amount of weight you are allowed to bear on your foot will be written on your discharge sheet filled out at the time of surgery  The following is an explanation of the possibilities:     Non-weight bearing: You are to put NO weight whatsoever on your foot  When using crutches or a walker, your foot should not touch the ground, except when you are standing  Then, it may rest on the ground  If you are to be non-weight bearing, and you are not compliant, you could compromise the surgery  Some of our patients have been requesting prescriptions for a roll-a-bout knee scooter  BC and other insurances have been denying these claims, and you may either have to rent one or pay out of pocket to purchase one

## 2020-07-14 NOTE — H&P (VIEW-ONLY)
ROXANA Aj  Attending, Orthopaedic Surgery  [facility] [address] [facility]      ORTHOPAEDIC FOOT AND ANKLE CLINIC VISIT     Assessment:     Encounter Diagnoses   Name Primary?  Laceration of muscle and tendon of long extensor muscle of toe at ankle and foot level, left foot, initial encounter Yes    Laceration of left ankle, initial encounter             Plan:   · The patient verbalized understanding of exam findings and treatment plan  We engaged in the shared decision-making process and treatment options were discussed at length with the patient  Surgical and conservative management discussed today along with risks and benefits  · Given exam, patient has a high likelihood of anterior compartment tendon injury and possible deep peroneal nerve injury  · We will schedule the patient for I&D of Left ankle wound with exploration and possible tendon repair  · She is to take Lovenox 40 mg daily post-operatively for 30 days and follow up post-operatively  Return in about 3 weeks (around 8/4/2020)  CONSENT FOR SOFT TISSUE PROCEDURES:   Patient understands that there is no guarantee that the surgery will relieve all of their pain and also understands that there may be a prolonged course of protected weight-bearing status required which will restrict them from driving and other activities as discussed at today's visit  Patient recognizes that there are risks with surgery including bleeding, numbness, nerve irritation, wound complications, infection, continued pain, anesthetic complications, death, failure of procedure and possible need for further surgery  The patient understands that there is no guarantee that this surgery will relieve all of Her pain and symptoms  Patient understands that there is no guarantee that they will return to full function after the procedure  Patient has provided informed consent for the procedure           History of Present Illness:   Chief Complaint:   Chief Complaint   Patient presents with    Left Ankle - Pain     Dez Mayes is a 22 y o  female who is being seen for left ankle laceration  Patient notes on Saturday she kicked her foot into a fish tank in a dark basement while intoxicated, feeling immediate pain and cutting her lower leg  Pain is localized at ankle with minimal radiating and described as sharp and severe  She was seen in the ED and could not extend her ankle or toes on exam  She had decreased sensation in the deep peroneal nerve distribution as well  The joint was challenged which was negative and she was closed and splinted  Patient today denies numbness, tingling or radicular pain  Denies history of neuropathy  Patient does not smoke, does not have diabetes and does not take blood thinners  Patient denies family history of anesthesia complications and has not had any complications with anesthesia  Pain/symptom timing:  Worse during the day when active  Pain/symptom context:  Worse with activites and work  Pain/symptom modifying factors:  Rest makes better, activities make worse  Pain/symptom associated signs/symptoms: none    Prior treatment   · NSAIDsYes   · Injections No   · Bracing/Orthotics No    · Physical Therapy No     Orthopedic Surgical History:   See Below    Past Medical, Surgical and Social History:  Past Medical History:  has no past medical history on file  Problem List: does not have any pertinent problems on file  Past Surgical History:  has no past surgical history on file  Family History: family history includes Diabetes in her mother; Hypertension in her mother; No Known Problems in her father  Social History:  reports that she has never smoked  She has never used smokeless tobacco  She reports that she drinks alcohol  She reports that she has current or past drug history  Drug: Marijuana    Current Medications: has a current medication list which includes the following prescription(s): acetaminophen, levofloxacin, oxycodone, and tri femynor  Allergies: has No Known Allergies  Review of Systems:  General- denies fever/chills  HEENT- denies hearing loss or sore throat  Eyes- denies eye pain or visual disturbances, denies red eyes  Respiratory- denies cough or SOB  Cardio- denies chest pain or palpitations  GI- denies abdominal pain  Endocrine- denies urinary frequency  Urinary- denies pain with urination  Musculoskeletal- Negative except noted above  Skin- denies rashes or wounds  Neurological- denies dizziness or headache  Psychiatric- denies anxiety or difficulty concentrating    Physical Exam:   Ht 5' 7" (1 702 m)   Wt 69 9 kg (154 lb)   LMP 07/01/2020 (Exact Date)   BMI 24 12 kg/m²   General/Constitutional: No apparent distress: well-nourished and well developed  Eyes: normal ocular motion  Cardio: RRR, Normal S1S2, No m/r/g  Lymphatic: No appreciable lymphadenopathy  Respiratory: Non-labored breathing, CTA b/l no w/c/r  Vascular: No edema, swelling or tenderness, except as noted in detailed exam   Integumentary: No impressive skin lesions present, except as noted in detailed exam   Neuro: No ataxia or tremors noted  Psych: Normal mood and affect, oriented to person, place and time  Appropriate affect  Musculoskeletal: Normal, except as noted in detailed exam and in HPI  Examination    Left    Gait Ambulating using crutches   Musculoskeletal Tender to palpation at distal tibia and ankle    Skin Normal       Nails Normal    Range of Motion  Unable to dorsiflex ankle and toes, Normal degrees plantarflexion  Subtalar motion: Normal    Stability Stable    Muscle Strength 0/5 tibialis anterior  5/5 gastrocnemius-soleus  5/5 posterior tibialis  5/5 peroneal/eversion strength  0/5 EHL  0/5 EDC  5/5 FHL    Neurologic Normal    Sensation Decreased sensation in deep peroneal distribution  [place]-Allan 5 07 filament (10g) testing deferred      Cardiovascular Brisk capillary refill < 2 seconds,intact DP and PT pulses    Special Tests None      Imaging Studies:   3 views of the left ankle were taken, reviewed and interpreted independently that demonstrate no acute fractures or dislocations  Reviewed by me personally  Channie Song Lachman, MD  Foot & Ankle Surgery   Department of [address] [place] [address]      I personally performed the service  Channie Song Lachman, MD

## 2020-07-15 NOTE — PRE-PROCEDURE INSTRUCTIONS
Pre-Surgery Instructions:   Medication Instructions    acetaminophen (TYLENOL) 325 mg tablet Instructed patient per Anesthesia Guidelines   levofloxacin (LEVAQUIN) 750 mg tablet Instructed patient per Anesthesia Guidelines   oxyCODONE (ROXICODONE) 5 mg immediate release tablet Instructed patient per Anesthesia Guidelines   rivaroxaban (XARELTO) 10 mg tablet Instructed patient per Anesthesia Guidelines   TRI FEMYNOR 0 18/0 215/0 25 MG-35 MCG per tablet Instructed patient per Anesthesia Guidelines  Before your operation, you play an important role in decreasing your risk for infection by washing with special antiseptic soap  This is an effective way to reduce bacteria on the skin which may help to prevent infections at the surgical site  Please read the following directions in advance  1  In the week before your operation purchase a 4 ounce bottle of antiseptic soap containing chlorhexidine gluconate 4%  Some brand names include: Aplicare, Endure, and Hibiclens  The cost is usually less than $5 00  · For your convenience, the [address] [place] [address] carries the soap  · It may also be available at your doctor's office or pre-admission testing center, and at most retail pharmacies  · If you are allergic or sensitive to soaps containing chlorhexidine gluconate (CHG), please let your doctor know so another antiseptic soap can be suggested  · CHG antiseptic soap is for external use only  2      The day before your operation follow these directions carefully to get ready  · Place clean lines (sheets) on your bed; you should sleep on clean sheets after your evening shower  · Get clean towels and washcloths ready - you need enough for 2 showers  · Set aside clean underwear, pajamas, and clothing to wear after the shower  Reminders:  · DO NOT use any other soap or body rinse on your skin during or after the antiseptic showers    · DO NOT use lotion , powder, deodorant, or perfume/aftershave of any kind on your skin after your antiseptic shower  · DO NOT shave any body parts in the 24 hours/the day before your operation  · DO NOT get the antiseptic soap in your eyes, ears, nose, mouth, or vaginal area  3      You will need to shower the night before AND the morning of your Surgery  Shower 1:  · The evening before your operation, take the fist shower  · First, shampoo your hair with regular shampoo and rinse it completely before you use the anitseptic soap  After washing and rinsing your hair, rinse your body  · Next, use a clean wash cloth to apply the antiseptic soap and wash your body from the neck down to your toes using 1/2 bottle of the antiseptic soap  You will use the other 1/2 bottle for the second shower  · Clean the area where your incision will be; later this area well for about 2 minutes  · If you ar having head or neck surgery, wash areas with the antiseptic soap  · Rinse yourself completely with running water  · Use a clean towel to dry off  · Wear clean underwear and clothing/pajamas  Shower 2:  · The Morning of your operation, take the second shower following the same steps as Shower 1 using the second 1/2 of the bottle of antiseptic soap  · Use clean cloths and towels to was and dry yourself off  · Wear clean underwear and clothing

## 2020-07-16 ENCOUNTER — HOSPITAL ENCOUNTER (OUTPATIENT)
Facility: HOSPITAL | Age: 25
Setting detail: OUTPATIENT SURGERY
Discharge: HOME | End: 2020-07-16
Attending: ORTHOPAEDIC SURGERY | Admitting: ORTHOPAEDIC SURGERY
Payer: COMMERCIAL

## 2020-07-16 ENCOUNTER — TELEPHONE (OUTPATIENT)
Dept: OBGYN CLINIC | Facility: HOSPITAL | Age: 25
End: 2020-07-16

## 2020-07-16 ENCOUNTER — ANESTHESIA (OUTPATIENT)
Dept: PERIOP | Facility: HOSPITAL | Age: 25
End: 2020-07-16
Payer: COMMERCIAL

## 2020-07-16 VITALS
SYSTOLIC BLOOD PRESSURE: 118 MMHG | WEIGHT: 154 LBS | TEMPERATURE: 99 F | HEART RATE: 70 BPM | OXYGEN SATURATION: 98 % | DIASTOLIC BLOOD PRESSURE: 76 MMHG | BODY MASS INDEX: 24.17 KG/M2 | RESPIRATION RATE: 12 BRPM | HEIGHT: 67 IN

## 2020-07-16 DIAGNOSIS — S96.122A LACERATION OF MUSCLE AND TENDON OF LONG EXTENSOR MUSCLE OF TOE AT ANKLE AND FOOT LEVEL, LEFT FOOT, INITIAL ENCOUNTER: Primary | ICD-10-CM

## 2020-07-16 LAB
EXT PREGNANCY TEST URINE: NEGATIVE
EXT. CONTROL: NORMAL

## 2020-07-16 PROCEDURE — 28208 REPAIR OF FOOT TENDON: CPT | Performed by: ORTHOPAEDIC SURGERY

## 2020-07-16 PROCEDURE — 81025 URINE PREGNANCY TEST: CPT | Performed by: ORTHOPAEDIC SURGERY

## 2020-07-16 PROCEDURE — 28208 REPAIR OF FOOT TENDON: CPT | Performed by: PHYSICIAN ASSISTANT

## 2020-07-16 PROCEDURE — 64704 REVISE HAND/FOOT NERVE: CPT | Performed by: ORTHOPAEDIC SURGERY

## 2020-07-16 PROCEDURE — 37618 LIGATION MAJOR ARTERY XTR: CPT | Performed by: ORTHOPAEDIC SURGERY

## 2020-07-16 PROCEDURE — 64704 REVISE HAND/FOOT NERVE: CPT | Performed by: PHYSICIAN ASSISTANT

## 2020-07-16 PROCEDURE — 37618 LIGATION MAJOR ARTERY XTR: CPT | Performed by: PHYSICIAN ASSISTANT

## 2020-07-16 RX ORDER — FENTANYL CITRATE/PF 50 MCG/ML
25 SYRINGE (ML) INJECTION
Status: DISCONTINUED | OUTPATIENT
Start: 2020-07-16 | End: 2020-07-16 | Stop reason: HOSPADM

## 2020-07-16 RX ORDER — LIDOCAINE HYDROCHLORIDE 10 MG/ML
INJECTION, SOLUTION EPIDURAL; INFILTRATION; INTRACAUDAL; PERINEURAL AS NEEDED
Status: DISCONTINUED | OUTPATIENT
Start: 2020-07-16 | End: 2020-07-16 | Stop reason: SURG

## 2020-07-16 RX ORDER — CHLORHEXIDINE GLUCONATE 0.12 MG/ML
15 RINSE ORAL ONCE
Status: DISCONTINUED | OUTPATIENT
Start: 2020-07-16 | End: 2020-07-16 | Stop reason: HOSPADM

## 2020-07-16 RX ORDER — CEFAZOLIN SODIUM 1 G/50ML
SOLUTION INTRAVENOUS AS NEEDED
Status: DISCONTINUED | OUTPATIENT
Start: 2020-07-16 | End: 2020-07-16

## 2020-07-16 RX ORDER — SODIUM CHLORIDE, SODIUM LACTATE, POTASSIUM CHLORIDE, CALCIUM CHLORIDE 600; 310; 30; 20 MG/100ML; MG/100ML; MG/100ML; MG/100ML
75 INJECTION, SOLUTION INTRAVENOUS CONTINUOUS
Status: DISCONTINUED | OUTPATIENT
Start: 2020-07-16 | End: 2020-07-16 | Stop reason: HOSPADM

## 2020-07-16 RX ORDER — HYDROMORPHONE HCL/PF 1 MG/ML
0.5 SYRINGE (ML) INJECTION
Status: DISCONTINUED | OUTPATIENT
Start: 2020-07-16 | End: 2020-07-16 | Stop reason: HOSPADM

## 2020-07-16 RX ORDER — ONDANSETRON 4 MG/1
4 TABLET, FILM COATED ORAL EVERY 8 HOURS PRN
Qty: 20 TABLET | Refills: 0 | Status: SHIPPED | OUTPATIENT
Start: 2020-07-16

## 2020-07-16 RX ORDER — MAGNESIUM HYDROXIDE 1200 MG/15ML
LIQUID ORAL AS NEEDED
Status: DISCONTINUED | OUTPATIENT
Start: 2020-07-16 | End: 2020-07-16 | Stop reason: HOSPADM

## 2020-07-16 RX ORDER — ROCURONIUM BROMIDE 10 MG/ML
INJECTION, SOLUTION INTRAVENOUS AS NEEDED
Status: DISCONTINUED | OUTPATIENT
Start: 2020-07-16 | End: 2020-07-16 | Stop reason: SURG

## 2020-07-16 RX ORDER — PROPOFOL 10 MG/ML
INJECTION, EMULSION INTRAVENOUS AS NEEDED
Status: DISCONTINUED | OUTPATIENT
Start: 2020-07-16 | End: 2020-07-16 | Stop reason: SURG

## 2020-07-16 RX ORDER — GLYCOPYRROLATE 0.2 MG/ML
INJECTION INTRAMUSCULAR; INTRAVENOUS AS NEEDED
Status: DISCONTINUED | OUTPATIENT
Start: 2020-07-16 | End: 2020-07-16 | Stop reason: SURG

## 2020-07-16 RX ORDER — PROPOFOL 10 MG/ML
INJECTION, EMULSION INTRAVENOUS CONTINUOUS PRN
Status: DISCONTINUED | OUTPATIENT
Start: 2020-07-16 | End: 2020-07-16 | Stop reason: SURG

## 2020-07-16 RX ORDER — OXYCODONE HYDROCHLORIDE 5 MG/1
10 TABLET ORAL ONCE AS NEEDED
Status: COMPLETED | OUTPATIENT
Start: 2020-07-16 | End: 2020-07-16

## 2020-07-16 RX ORDER — OXYCODONE HYDROCHLORIDE 5 MG/1
5 TABLET ORAL EVERY 4 HOURS PRN
Qty: 30 TABLET | Refills: 0 | Status: SHIPPED | OUTPATIENT
Start: 2020-07-16 | End: 2020-07-21

## 2020-07-16 RX ORDER — ROPIVACAINE HYDROCHLORIDE 5 MG/ML
INJECTION, SOLUTION EPIDURAL; INFILTRATION; PERINEURAL
Status: COMPLETED | OUTPATIENT
Start: 2020-07-16 | End: 2020-07-16

## 2020-07-16 RX ORDER — MIDAZOLAM HYDROCHLORIDE 2 MG/2ML
INJECTION, SOLUTION INTRAMUSCULAR; INTRAVENOUS
Status: COMPLETED | OUTPATIENT
Start: 2020-07-16 | End: 2020-07-16

## 2020-07-16 RX ORDER — CEFAZOLIN SODIUM 1 G/50ML
1000 SOLUTION INTRAVENOUS ONCE
Status: COMPLETED | OUTPATIENT
Start: 2020-07-16 | End: 2020-07-16

## 2020-07-16 RX ORDER — NEOSTIGMINE METHYLSULFATE 1 MG/ML
INJECTION INTRAVENOUS AS NEEDED
Status: DISCONTINUED | OUTPATIENT
Start: 2020-07-16 | End: 2020-07-16 | Stop reason: SURG

## 2020-07-16 RX ORDER — ONDANSETRON 2 MG/ML
INJECTION INTRAMUSCULAR; INTRAVENOUS AS NEEDED
Status: DISCONTINUED | OUTPATIENT
Start: 2020-07-16 | End: 2020-07-16 | Stop reason: SURG

## 2020-07-16 RX ORDER — FENTANYL CITRATE 50 UG/ML
INJECTION, SOLUTION INTRAMUSCULAR; INTRAVENOUS
Status: COMPLETED | OUTPATIENT
Start: 2020-07-16 | End: 2020-07-16

## 2020-07-16 RX ORDER — CHLORHEXIDINE GLUCONATE 4 G/100ML
SOLUTION TOPICAL DAILY PRN
Status: DISCONTINUED | OUTPATIENT
Start: 2020-07-16 | End: 2020-07-16 | Stop reason: HOSPADM

## 2020-07-16 RX ORDER — KETOROLAC TROMETHAMINE 30 MG/ML
INJECTION, SOLUTION INTRAMUSCULAR; INTRAVENOUS AS NEEDED
Status: DISCONTINUED | OUTPATIENT
Start: 2020-07-16 | End: 2020-07-16 | Stop reason: SURG

## 2020-07-16 RX ORDER — VANCOMYCIN HYDROCHLORIDE 1 G/20ML
INJECTION, POWDER, LYOPHILIZED, FOR SOLUTION INTRAVENOUS AS NEEDED
Status: DISCONTINUED | OUTPATIENT
Start: 2020-07-16 | End: 2020-07-16 | Stop reason: HOSPADM

## 2020-07-16 RX ADMIN — MIDAZOLAM 2 MG: 1 INJECTION INTRAMUSCULAR; INTRAVENOUS at 07:32

## 2020-07-16 RX ADMIN — OXYCODONE HYDROCHLORIDE 10 MG: 5 TABLET ORAL at 10:39

## 2020-07-16 RX ADMIN — FENTANYL CITRATE 25 MCG: 50 INJECTION, SOLUTION INTRAMUSCULAR; INTRAVENOUS at 08:38

## 2020-07-16 RX ADMIN — CEFAZOLIN SODIUM 1000 MG: 1 SOLUTION INTRAVENOUS at 07:45

## 2020-07-16 RX ADMIN — ROCURONIUM BROMIDE 50 MG: 10 INJECTION, SOLUTION INTRAVENOUS at 07:41

## 2020-07-16 RX ADMIN — SODIUM CHLORIDE, SODIUM LACTATE, POTASSIUM CHLORIDE, AND CALCIUM CHLORIDE 75 ML/HR: .6; .31; .03; .02 INJECTION, SOLUTION INTRAVENOUS at 07:04

## 2020-07-16 RX ADMIN — MIDAZOLAM 2 MG: 1 INJECTION INTRAMUSCULAR; INTRAVENOUS at 07:22

## 2020-07-16 RX ADMIN — ROPIVACAINE HYDROCHLORIDE 30 ML: 5 INJECTION, SOLUTION EPIDURAL; INFILTRATION; PERINEURAL at 07:22

## 2020-07-16 RX ADMIN — NEOSTIGMINE METHYLSULFATE 3 MG: 1 INJECTION, SOLUTION INTRAVENOUS at 08:59

## 2020-07-16 RX ADMIN — FENTANYL CITRATE 50 MCG: 50 INJECTION, SOLUTION INTRAMUSCULAR; INTRAVENOUS at 07:45

## 2020-07-16 RX ADMIN — PROPOFOL 150 MG: 10 INJECTION, EMULSION INTRAVENOUS at 07:40

## 2020-07-16 RX ADMIN — PROPOFOL 100 MCG/KG/MIN: 10 INJECTION, EMULSION INTRAVENOUS at 07:30

## 2020-07-16 RX ADMIN — FENTANYL CITRATE 25 MCG: 50 INJECTION INTRAMUSCULAR; INTRAVENOUS at 09:13

## 2020-07-16 RX ADMIN — GLYCOPYRROLATE 0.6 MG: 0.2 INJECTION, SOLUTION INTRAMUSCULAR; INTRAVENOUS at 08:59

## 2020-07-16 RX ADMIN — HYDROMORPHONE HYDROCHLORIDE 0.5 MG: 1 INJECTION, SOLUTION INTRAMUSCULAR; INTRAVENOUS; SUBCUTANEOUS at 09:24

## 2020-07-16 RX ADMIN — ONDANSETRON 4 MG: 2 INJECTION INTRAMUSCULAR; INTRAVENOUS at 08:28

## 2020-07-16 RX ADMIN — FENTANYL CITRATE 25 MCG: 50 INJECTION INTRAMUSCULAR; INTRAVENOUS at 09:18

## 2020-07-16 RX ADMIN — FENTANYL CITRATE 25 MCG: 50 INJECTION, SOLUTION INTRAMUSCULAR; INTRAVENOUS at 08:48

## 2020-07-16 RX ADMIN — KETOROLAC TROMETHAMINE 30 MG: 30 INJECTION, SOLUTION INTRAMUSCULAR; INTRAVENOUS at 08:31

## 2020-07-16 RX ADMIN — LIDOCAINE HYDROCHLORIDE 40 MG: 10 INJECTION, SOLUTION EPIDURAL; INFILTRATION; INTRACAUDAL; PERINEURAL at 07:29

## 2020-07-16 RX ADMIN — FENTANYL CITRATE 50 MCG: 50 INJECTION, SOLUTION INTRAMUSCULAR; INTRAVENOUS at 07:22

## 2020-07-16 NOTE — OP NOTE
OPERATIVE REPORT  PATIENT NAME: Celso Hackett    :  1995  MRN: [id number]  Pt Location:  OR ROOM 02    SURGERY DATE: 2020    Surgeon(s) and Role:     Adriano Kulkarni MD - Primary     * Neelam De Leon PA-C - Assisting    Preop Diagnosis:  Laceration of muscle and tendon of long extensor muscle of toe at ankle and foot level, left foot, initial encounter [J01 277F]  Laceration of left ankle, initial encounter [S91 012A]    Post-Op Diagnosis Codes:     * Laceration of muscle and tendon of long extensor muscle of toe at ankle and foot level, left foot, initial encounter [U81 505H]     * Laceration of left ankle, initial encounter [S91 012A]    Procedure(s) (LRB):  REPAIR TENDON FOOT, Wound exploration, I+D and tendon repair left ankle/foot (Left)    Specimen(s):  * No specimens in log *    Estimated Blood Loss:   Minimal    Drains:  * No LDAs found *    Anesthesia Type:   Choice    Operative Indications:  Laceration of muscle and tendon of long extensor muscle of toe at ankle and foot level, left foot, initial encounter [R51 433D]  Laceration of left ankle, initial encounter [S91 012A]      Operative Findings:  Complete lacerations of  1  EHL  2  EDC  3  Anterior tibial artery and vein  4  Deep peroneal Nerve  Partial laceration of  1  Tibialis Anterior tendon (90%)    Complications:   None    Procedure and Technique:  1  I+D of open ankle laceration  2  Repair of partially lacerated (60%) tibialis anterior tendon   3  End to end Repair of extensor hallucis longus  4  End to end Repair of extensor digitorum communis  5  Ligation of anterior tibial artery and vein  6  Intramuscular nerve transposition to prevent neuroma of Deep peroneal nerve  7  Placement into short leg nonweightbearing plaster splint    The patient gave written informed consent and was brought to the operating room and placed supine on an OR table with a thigh tourniquet set to 250mm pressure    After formal time-out was conducted the [facility], WATERVLIET bandage was used to exsanguinate the limb  Attention was 1st turned to the wound on the anterior aspect of the ankle  We extended the laceration proximally and distally to identify the tendon ends both proximally and distally  We immediately encountered tibialis anterior and Extensor Hallucis longus  Sharp dissection was carried through the skin  Bovie electrocautery was used to control hemostasis  The NV bundle was identified and was not intact  There was a large hematoma that we evacuated surrounding the anterior tibial artery  We elected to ligate this artery and vein as the foot was well perfused and the injury was 4 days ago  Next we cauderized the tip of the deep peroneal nerve to prevent neuroma formation and then performed an intramuscular nerve transposition into the tibialis anterior muscle belly to prevent neuroma formation  We then irrigated this wound thoroughly with 2L of NSS  We identified the tibialis anterior tendon and it had a laceration of 60% of its width and then performed a repair of this tendon primarily using 2-0 ethibond suture in a modified Powell style stitch  The ankle and foot was maintained in dorsiflexion through the duration of the procedure to aid in the repair  We then whip stitched the tendon with 0 vicryl to provide added strength to the repair  We did the same thing to the extensor hallucis longus tendon using a 2-0 ethibond and 0-vicryl stitch  We also noted a complete rupture of the Extensor digitorum communis  We individually repaired all 4 tendons of this muscle using the same technique as noted above  The wound was copiously irrigated, and vancomycin powder was applied to the wound bed  The  retinaculum was closed with 2-0 Vicryl, followed by 4-0 Vicryl and 3-0 nylon  A well-padded dressing was applied and finished off with a posterior-U splint in slight dorsiflexion to protect the tendon repairs       I was present for the entire procedure, A qualified resident physician was not available and A physician assistant was required during the procedure for retraction tissue handling,dissection and suturing    Patient Disposition:  PACU     SIGNATURE: Krissy Mathis MD  DATE: July 16, 2020  TIME: 7:25 AM

## 2020-07-16 NOTE — ANESTHESIA PROCEDURE NOTES
Peripheral Block    Patient location during procedure: holding area  Start time: 7/16/2020 7:22 AM  Reason for block: at surgeon's request and post-op pain management  Staffing  Performed: anesthesiologist   Preanesthetic Checklist  Completed: patient identified, site marked, surgical consent, pre-op evaluation, timeout performed, IV checked, risks and benefits discussed and monitors and equipment checked  Peripheral Block  Patient position: left lateral decubitus  Prep: Betadine  Patient monitoring: heart rate, continuous pulse ox and frequent blood pressure checks  Block type: popliteal  Laterality: left  Injection technique: single-shot  Procedures: ultrasound guided, Ultrasound guidance required for the procedure to increase accuracy and safety of medication placement and decrease risk of complications   and nerve stimulator  Ultrasound permanent image savedropivacaine (NAROPIN) 0 5 % perineural infiltration, 30 mL  midazolam (VERSED) 2 mg/2 mL IV, 2 mg  fentaNYL 50 mcg/mL IV, 50 mcg  Needle  Needle type: Stimuplex   Needle gauge: 21 G  Needle length: 10 cm  Needle localization: ultrasound guidance and nerve stimulator  Test dose: negative  Assessment  Injection assessment: incremental injection, local visualized surrounding nerve on ultrasound, negative aspiration for heme and no paresthesia on injection  Paresthesia pain: none  Heart rate change: no  Slow fractionated injection: yes  Post-procedure:  site cleaned  patient tolerated the procedure well with no immediate complications

## 2020-07-16 NOTE — ANESTHESIA PREPROCEDURE EVALUATION
Review of Systems/Medical History  Patient summary reviewed  Chart reviewed  No history of anesthetic complications     Cardiovascular  Negative cardio ROS Exercise tolerance (METS): >4,     Pulmonary  Smoker cigar smoker  ,   Comment: Hx marijuana use     GI/Hepatic  Negative GI/hepatic ROS          Negative  ROS        Endo/Other  Negative endo/other ROS      GYN  Negative gynecology ROS          Hematology  Negative hematology ROS      Musculoskeletal  Negative musculoskeletal ROS        Neurology    Paresthesias,   Comment: + altered sensation/numbness left foot Psychology   Negative psychology ROS              Physical Exam    Airway    Mallampati score: I  TM Distance: >3 FB  Neck ROM: full     Dental   No notable dental hx     Cardiovascular  Comment: Negative ROS, Rhythm: regular, Rate: normal, Cardiovascular exam normal    Pulmonary  Pulmonary exam normal Breath sounds clear to auscultation,     Other Findings        Anesthesia Plan  ASA Score- 2     Anesthesia Type- IV sedation with anesthesia, general and regional with ASA Monitors  Additional Monitors:   Airway Plan: LMA  Comment: Pt with preexisting nerve injury/paresthesias Left foot due to injury  Dr Liz Reed requests nerve block  R/B/A d/w pt  Pt wishes to proceed with block/IVsedation/GA  Plan Factors-    Induction- intravenous  Postoperative Plan- Plan for postoperative opioid use  Informed Consent- Anesthetic plan and risks discussed with patient  I personally reviewed this patient with the CRNA  Discussed and agreed on the Anesthesia Plan with the CRNA  Kristen Cespedes

## 2020-07-16 NOTE — DISCHARGE INSTRUCTIONS
ROXANA Shanks  Attending, [address] [place] [address] Office Phone: [phone] ? Fax: [phone]  Ha Office Phone: [phone] ? FGY:[phone]    : Zonia Soriano [place], [place]    Surgery Coordinator [facility]: Rowdyzio Homans, [phone]  Surgery Coordinator [place]:  Wing Vaughan, [phone]  www [email or web address]   PRE-OPERATIVE AND POST-OPERATIVE INSTRUCTIONS    General Information:   Typical post operative visits are at the following intervals:  2-3 weeks post surgery, 6 weeks post surgery, 3 months post surgery, 6 months post surgery, and then on a yearly basis  However, this may change based on Dr Prado Double recommendation   #1 post-operative rule for foot/ankle surgery:  ONCE YOU ARE OUT OF YOUR CAST AND/OR REMOVABLE BOOT, SWELLING MAY PERSIST FOR MANY MONTHS  YOU MIGHT ALSO EXPERIENCE A BLUISH DISCOLORATION OF YOUR LEG  THIS IS NORMAL AND PART OF THE USUAL POSTOPERATIVE EXPERIENCE    DO NOT WAIT UNTIL YOUR BLOCK WEARS OFF TO TAKE YOUR PAIN MEDICATION  IT TAKES A FEW DOSES OF THE PAIN MEDICATION TO REACH A THERAPEUTIC LEVEL  TAKE A TABLET PROACTIVELY BEFORE YOU HAVE ANY PAIN AND AGAIN 4 HOURS LATER SO WHEN THE BLOCK WEARS OFF, YOU ARE NOT CAUGHT OFF GUARD  SMOKING:   Smoking results in incomplete healing of fractures (broken bones) and joints that my have been fused  Smoking and nicotine also prevents the growth of bone into ankle replacements and bone healing  It also slows the healing of muscles and skin (soft tissue)  Therefore, please do not have surgery if you continue to smoke  We reserve the right to cancel your surgery if we suspect that you are smoking  DO NOT use nicorette gum or other patches  Please find an alternative method to quit smoking before your surgery and do not restart after surgery to allow for healing  THREE RULES:    1   After surgery you will most likely be given the instructions KEEP YOUR TOES ABOVE YOUR NOSE    This means that you MUST have your feet elevated higher than your heart  Keeping your toes above your nose helps to heal the muscles and skin (soft tissues) by reducing swelling in your leg  This position also helps to prevent infection, and is very important in avoiding deep venous thrombosis (blood clots)  2  In order to keep the blood circulating in your legs and in order to avoid deep vein   thrombosis (blood clots), we ask patients to GET UP ONCE AN HOUR during the day  This means you should at least cross the room and come back  It does not mean you have to be up for long periods of time  In most cases we will not have people immediately put any weight on their operated part  This is important to prevent loosening of metal or other devices holding the bones together  It also prevents irritation of the soft tissues which can lead to prolonged healing  When we say get up once an hour, please walk, hop or move with an assisted device  This is important! 3  Do not do any excessive walking during the first few days after surgery  Recovering from surgery is a full-time task for the patient  Postoperative care is important to avoid irritating the skin incision, which can lead to infection  Please do not plan activities or go out of town for several weeks after surgery  If you are unsure about your future activities, please schedule surgery only when you know it is acceptable for you  Scheduling surgery and then canceling the date, prevents other people from having surgery on that date as it takes time to line everything up effectively  If you cancel your surgery the week of your planned surgery, we reserve the right to cancel all future surgical procedures  AFTER YOUR SURGERY:   Bleeding through the bandage almost always occurs  Do not let this alarm you    Simply add more gauze or a towel, call us, and come in for a dressing change  If you think it is excessive, contact us immediately or go to the local emergency room   Do not get the bandage wet  Showering is possible with plastic protectors  Be very careful, as the bathroom can be wet and slippery  If you do get your dressing wet, it should be changed immediately  Please contact us   ONCE YOUR ARE OUT OF YOUR CAST AND/OR REMOVABLE BOOT, SWELLING MAY PERSIST FOR MANY MONTHS  YOU MIGHT ALSO EXPERIENCE A BLUISH DISCOLORATION OF YOUR LEG  THIS IS NORMAL AND PART OF THE USUAL POSTOPERATIVE EXPERIENCE  WEARING COMPRESSION HOSE (ELASTIC STOCKINGS) CAN HELP AVOID SOME OF THIS SWELLING  DRESSING:   The purpose of the surgical dressing is to keep your wound and the surgical site protected from the environment  Most dressings contain splints, which help to hold your foot and ankle in a corrected position, and also allow the surgical site to heal properly  If you have a drain in place, this will need to be removed in 1-3 days after surgery  The time for the drain to be pulled will be written on your discharge instruction sheet  CAST  INSTRUCTIONS:  You may or may not get a cast following surgery  If you do, pay close attention to the following:     After application of a splint or cast, it is very important to elevate your leg for 24 to 72 hours  The injured area should be elevated well above the heart  Remember Toes above your Nose  Rest and elevation greatly reduce pain and speed the healing process by minimizing early swelling      CALL YOUR DOCTORS OFFICE OR VISIT LOCATION EMERGENCY ROOM IF YOU HAVE ANY OF THE FOLLOWING:     Significant increased pain, which may be caused by swelling, and the feeling that the splint or cast is too tight   Numbness and tingling in your hand or foot, which may be caused by too much pressure on the nerves   Burning and stinging, which may be caused by too much pressure on the skin   Excessive swelling below the cast, which may mean the cast is slowing your blood circulation   Loss of active movement of toes, which request an urgent evaluation   Loss of capillary refill  Pinch the tip of toes and shana the skin  Release pressure and if the skin does not return pink then call the office immediately  DO NOT GET YOUR CAST WET  Bacteria thrive in moist dark areas  We do not want this  If your cast becomes wet, return to the office and we will apply another one  PAIN AFTER SURGERY:  Narcotic pain medication can and will depress your respiratory system if taken in excess  The goal of pain management with narcotics is to be comfortable not pain free  If you take enough narcotics to be pain free then you run the risk of stopping breathing  If this happens, call 911 immediately!  Pain in the heel is often  caused by pressure from the weight of your foot on the bed  Make sure your heel is suspended off the bed by keeping a pillow underneath your calf not your knee  Medications: You will be given narcotic pain medication  Do NOT drive while taking narcotic medications  Medications such as Darvocet, Percocet, Vicoden or Tylenol #3, also contain acetaminophen (Tylenol)  Do not take acetaminophen or Tylenol from home when taking theses medications  When you fill your prescription, you may ask the pharmacist if your pain medication has acetaminophen/Tylenol in it  It is okay to take Tylenol with Oxycontin/Oxycodone  You will take Xarelto for blood clot prevention during your postoperative period  Narcotic medications commonly cause nausea  Taking them with food will decrease this side effect  If you are having extreme nausea, please contact us for an alternative medication or for something that can be taken with this medication to decrease the nausea  Also, narcotic medications frequently cause constipation   An increase of fiber, fruits and vegetables in your diet may alleviate this problem, or if necessary, you may use an over-the-counter medication such as senekot, colace, or Fibercon for constipation problems  You should resume all medications you were taking prior to the surgery unless otherwise specified  If you had fracture surgery, bony surgery like an osteotomy or fusion, or a surgery that requires bone healing, you are advised to take Vitamin D and Calcium to improve healing potential   Vitamin D3 4000 units/day and Calcium 1200mg/day  These are over the counter medications so please pick them up at the pharmacy when you are picking up your prescriptions  Activity:   Because of your recent foot surgery, your activity level will decrease  You will need to elevate your foot ABOVE the level of your heart for a minimum of four days  The length of time necessary for the swelling to go down, and for your wounds to heal properly depends greatly on your efforts here  Elevation is extremely important to avoid compromising the blood supply to your foot  Remember when your foot is down it will swell, which will increase pain and slow healing  Wiggle your toes frequently if possible  If you go home with a regional block, (a type of anesthesia) the foot and leg will be numb  Think of ways to get into your house and around the house until the block wears off  Keep in mind that it may be a legal issue if you drive while in a cast or splint, especially when the splint is on the right foot  You may call the Department of Motor Vehicles to schedule a road test if you have adaptive equipment applied to your car  The amount of weight you are allowed to bear on your foot will be written on your discharge sheet filled out at the time of surgery  The following is an explanation of the possibilities:     Non-weight bearing: You are to put NO weight whatsoever on your foot  When using crutches or a walker, your foot should not touch the ground, except when you are standing  Then, it may rest on the ground  If you are to be non-weight bearing, and you are not compliant, you could compromise the surgery  Some of our patients have been requesting prescriptions for a roll-a-bout knee scooter  [employer] and other insurances have been denying these claims, and you may either have to rent one or pay out of pocket to purchase one

## 2020-07-16 NOTE — TELEPHONE ENCOUNTER
Dad is calling to find out if okay to give Xarelto this evening  He was worried that anticoagulant may have been given already today  Please advise

## 2020-07-16 NOTE — INTERVAL H&P NOTE
H&P reviewed  After examining the patient I find no changes in the patients condition since the H&P had been written  Vitals:    07/16/20 0653   BP: 140/69   Pulse: 79   Resp: 16   Temp: 98 7 °F (37 1 °C)   SpO2: 100%     Left ankle wound exploration, I+D, possible tendon repair

## 2020-07-16 NOTE — ANESTHESIA POSTPROCEDURE EVALUATION
Post-Op Assessment Note    CV Status:  Stable  Pain Score: 0    Pain management: adequate     Mental Status:  Alert and awake   Hydration Status:  Euvolemic   PONV Controlled:  Controlled   Airway Patency:  Patent   Post Op Vitals Reviewed: Yes      Staff: Anesthesiologist, with CRNAs           BP      Temp 99 4 °F (37 4 °C) (07/16/20 0904)    Pulse (!) 133 (07/16/20 0904)   Resp      SpO2

## 2020-08-06 ENCOUNTER — OFFICE VISIT (OUTPATIENT)
Dept: OBGYN CLINIC | Facility: CLINIC | Age: 25
End: 2020-08-06
Payer: COMMERCIAL

## 2020-08-06 DIAGNOSIS — S85.142S: ICD-10-CM

## 2020-08-06 DIAGNOSIS — S96.122A LACERATION OF MUSCLE AND TENDON OF LONG EXTENSOR MUSCLE OF TOE AT ANKLE AND FOOT LEVEL, LEFT FOOT, INITIAL ENCOUNTER: Primary | ICD-10-CM

## 2020-08-06 DIAGNOSIS — S84.22XS: ICD-10-CM

## 2020-08-06 PROCEDURE — 99024 POSTOP FOLLOW-UP VISIT: CPT | Performed by: ORTHOPAEDIC SURGERY

## 2020-08-06 PROCEDURE — 29405 APPL SHORT LEG CAST: CPT | Performed by: ORTHOPAEDIC SURGERY

## 2020-08-06 NOTE — PROGRESS NOTES
ROXANA Shanks  Attending, Orthopaedic Surgery  Foot and Ankle  [facility]      ORTHOPAEDIC FOOT AND ANKLE POST-OP VISIT     Procedure:     Repair tibialis anterior, EHL, and EDC tendons, intramuscular nerve transposition of transected deep peroneal nerve, ligation of anterior tibial artery       Date of surgery:   7/16/20      PLAN  1  Weightbearing Status- NWB operative extremity in a cast  2  DVT prophylaxis-  BID  3  Continue to elevate 23hrs/day getting up 1x per hour to prevent a blood clot  4  Pain control- OTC pain medication  5  RTC in 3 week(s)  6  Xrays needed next visit - no     Cast application    Date/Time: 8/6/2020 10:43 AM  Performed by: Kt Escobar MD  Authorized by: Kt Escobar MD     Consent:     Consent obtained:  Verbal    Consent given by:  Patient    Alternatives discussed:  No treatment  Pre-procedure details:     Sensation:  Numbness  Procedure details:     Laterality:  Left    Location:  Ankle    Ankle:  L ankleCast type:  Short leg    Supplies:  Cotton padding and fiberglass            History of Present Illness:   Chief Complaint: Complex laceration left anterior compartment  Fernando Mcgraw is a 22 y o  female who is being seen for post-operative visit for the above procedure  Pain is well controlled and the patient has successfully transitioned to OTC pain medicines  she is taking ASA 325mg BID for DVT prophylaxis  Patient has been NWB in a short leg cast       Review of Systems:  General- denies fever/chills  Respiratory- denies cough or SOB  Cardio- denies chest pain or palpitations  GI- denies abdominal pain  Musculoskeletal- Negative except noted above  Skin- denies rashes or wounds    Physical Exam:   There were no vitals taken for this visit  General/Constitutional: No apparent distress: well-nourished and well developed    Eyes: normal ocular motion  Lymphatic: No appreciable lymphadenopathy  Respiratory: Non-labored breathing  Vascular: No edema, swelling or tenderness, except as noted in detailed exam   Integumentary: No impressive skin lesions present, except as noted in detailed exam   Neuro: No ataxia or tremors noted  Psych: Normal mood and affect, oriented to person, place and time  Appropriate affect  Musculoskeletal: Normal, except as noted in detailed exam and in HPI  Examination    left        Incision Clean, dry, intact  Sutures Removed this visit    Ecchymosis none    Swelling Mild    Sensation Intact to light touch throughout sural, saphenous, superficial peroneal, deep peroneal and medial/lateral plantar nerve distributions  [place]-Allan 5 07 filament (10g) testing deferred  Cardiovascular Brisk capillary refill < 2 seconds,intact DP and PT pulses    Special Tests None      Imaging Studies:   None        James R Lachman, MD  Foot & Ankle Surgery   Department of [address] [place] [address]      I personally performed the service  Channie Song Lachman, MD

## 2020-08-07 ENCOUNTER — TELEPHONE (OUTPATIENT)
Dept: OBGYN CLINIC | Facility: MEDICAL CENTER | Age: 25
End: 2020-08-07

## 2020-08-07 NOTE — TELEPHONE ENCOUNTER
Patient sees Dr Ankush Gonsalez  Patient's father calling in questioning the medication Jorje Carmen is on  He wants to know should she continue to take the Xarelto, if so how long? Also, is she suppose to take the 2 baby aspirins, if so, along with the Xarelto or after she stops taking the Xarelto? He States she will be going back to college, can she have a handicap sign for her car? Please call Eva Cazares, father and advise further at [phone]

## 2020-08-07 NOTE — TELEPHONE ENCOUNTER
Patient's dad given above information and verbalized understanding  He will bring form to [place] on Thursday when Dr  Is there for him to fill out while he is in office

## 2020-08-07 NOTE — TELEPHONE ENCOUNTER
No she does not need to be on Aspirin  Disregard this note from yesterday  She is on Xarelto for blood clot prevention  She should continue the Xarelto for 6 weeks postoperatively  Handicapp placard applications are obtained through the [facility] website  She fills out her section, then drops off the paper for us to fill out ours

## 2020-08-27 ENCOUNTER — OFFICE VISIT (OUTPATIENT)
Dept: OBGYN CLINIC | Facility: CLINIC | Age: 25
End: 2020-08-27

## 2020-08-27 VITALS — BODY MASS INDEX: 24.12 KG/M2 | DIASTOLIC BLOOD PRESSURE: 70 MMHG | SYSTOLIC BLOOD PRESSURE: 110 MMHG | HEIGHT: 67 IN

## 2020-08-27 DIAGNOSIS — S84.22XS: Primary | ICD-10-CM

## 2020-08-27 DIAGNOSIS — S85.142S: ICD-10-CM

## 2020-08-27 DIAGNOSIS — S96.122A LACERATION OF MUSCLE AND TENDON OF LONG EXTENSOR MUSCLE OF TOE AT ANKLE AND FOOT LEVEL, LEFT FOOT, INITIAL ENCOUNTER: ICD-10-CM

## 2020-08-27 PROCEDURE — 99024 POSTOP FOLLOW-UP VISIT: CPT | Performed by: ORTHOPAEDIC SURGERY

## 2020-08-27 RX ORDER — PREDNISONE 10 MG/1
TABLET ORAL
COMMUNITY
Start: 2020-08-24

## 2020-08-27 RX ORDER — AMOXICILLIN 875 MG/1
TABLET, COATED ORAL
COMMUNITY
Start: 2020-07-30

## 2020-08-27 NOTE — PATIENT INSTRUCTIONS
Starting today  4 days of partial weight bearing 50%  Then, 4 days of partial weight bearing 75%  Then, 4 days of partial weight bearing 90%  Then full weight bearing in the boot and wean your crutches/rolling walker  Then 2 weeks of weightbearing as tolerated in the CAM boot  You may begin weaning your boot and transitioning to a sneaker (9/24)  It is important to do this gradually to avoid aggravating the healing process  1  9/24, you may come out of the boot into a sneaker for 2 hours  2  9/25, you may come out of the boot into a sneaker for 4 hours,  3  The next day, you may come out of the boot into a sneaker for 6 hours  4  Continue this (adding 2 hours per day) as you tolerate  For example, if you do 6 hours out of the boot into a sneaker and your foot swells more than usual at night and it is difficult to control the discomfort, do not advance to 8 hours the next day, stay at 6 hours until you are able to tolerate it  Elevation, Ice and tylenol and staying off of it at night will be important to aide in this transition out of the boot  Swelling and soreness are normal as you begin to do more with the injured leg  May DC aspirin/lovenox, no longer needed  May shower, do not soak in a tub/pool/ocean/etc for another 4 weeks  Continue PT  Scar massage- pea sized amount of lotion, massage into scar for 5 minutes each day  Compression stocking (Knee high, 20-30mm Hg) to be worn at all times  Must sleep in cam boot for the next 4 weeks 
on floor

## 2020-08-27 NOTE — PROGRESS NOTES
ROXANA Don  Attending, Orthopaedic Surgery  Foot and Ankle  [facility]      ORTHOPAEDIC FOOT AND ANKLE POST-OP VISIT     Procedure:     Repair tibialis anterior, EHL, and EDC tendons, intramuscular nerve transposition of transected deep peroneal nerve, ligation of anterior tibial artery       Date of surgery:   7/16/20      PLAN  1  Weightbearing Status- PWB operative extremity return to weight bearing in cam walker boot, transition to stiff soled sneaker  2  DVT prophylaxis- Completed Xarelto  3  Start physical therapy  4  Pain control- OTC pain medication  5  RTC in 6 weeks  6  Xrays needed next visit - no   7  Continue with elevation  8  6 week postop protocol initiated, instructions given  9  Compression stocking prescription given  History of Present Illness:   Chief Complaint:   Chief Complaint   Patient presents with    Left Ankle - Post-op     Venecia Maher is a 22 y o  female who is being seen for post-operative visit for the above procedure  Pain is well controlled and the patient has successfully transitioned to OTC pain medicines  she is taking Xarelto  for DVT prophylaxis  Patient has been NWB in a short leg cast       Review of Systems:  General- denies fever/chills  Respiratory- denies cough or SOB  Cardio- denies chest pain or palpitations  GI- denies abdominal pain  Musculoskeletal- Negative except noted above  Skin- denies rashes or wounds    Physical Exam:   /70 (BP Location: Left arm, Patient Position: Sitting, Cuff Size: Adult)   Ht 5' 7" (1 702 m)   BMI 24 12 kg/m²   General/Constitutional: No apparent distress: well-nourished and well developed    Eyes: normal ocular motion  Lymphatic: No appreciable lymphadenopathy  Respiratory: Non-labored breathing  Vascular: No edema, swelling or tenderness, except as noted in detailed exam   Integumentary: No impressive skin lesions present, except as noted in detailed exam   Neuro: No ataxia or tremors noted  Psych: Normal mood and affect, oriented to person, place and time  Appropriate affect  Musculoskeletal: Normal, except as noted in detailed exam and in HPI  Examination    left        Incision Clean, dry, intact  Sutures Previously removed  Ecchymosis none    Swelling mild    Sensation Intact to light touch throughout sural, saphenous, superficial peroneal, deep peroneal and medial/lateral plantar nerve distributions  [place]-Allan 5 07 filament (10g) testing deferred  Cardiovascular Brisk capillary refill < 2 seconds,intact DP and PT pulses    Special Tests None      Imaging Studies:   No new imaging performed  Scribe Attestation    I,:   Onelia Hector am acting as a scribe while in the presence of the attending physician :        I,:   Davis Carrel, MD personally performed the services described in this documentation    as scribed in my presence :            Angelina Cinnamon Lachman, MD  Foot & Ankle Surgery   Department [address]      I personally performed the service  Angelina Cinnamon Lachman, MD

## 2020-09-01 ENCOUNTER — TELEPHONE (OUTPATIENT)
Dept: OBGYN CLINIC | Facility: HOSPITAL | Age: 25
End: 2020-09-01

## 2020-09-01 NOTE — TELEPHONE ENCOUNTER
She is only 6 weeks out from a multi-tendon repair of her anterior compartment muscles from an open tendon laceration  I cannot write a note that states "no-restrictions" because she is to be in the boot at all times and is still supposed to be elevating the majority of the day and icing when not in physical therapy  The boot is a standard CAM boot  There is no such boot that I am aware of that is puncture proof, or at least we don't have access to any of them  If she needs a not keeping her out, that would be more appropriate  She didn't discuss any of this with us during her last office appointment

## 2020-09-01 NOTE — TELEPHONE ENCOUNTER
I put in a work note stating our treatment plan and that she may wear a plastic cast cover over the boot

## 2020-09-01 NOTE — TELEPHONE ENCOUNTER
Recommend she try to view the letter online instead of through the [employer] [facility]  Otherwise, I'm not sure if theres any way I can make is accessible through [employer]  She might need to come pick it up or we can send it to her

## 2020-09-01 NOTE — TELEPHONE ENCOUNTER
Patient is trying to access the letter for school in [place] but it is not available  Do you have to release it to [facility]?

## 2020-09-01 NOTE — TELEPHONE ENCOUNTER
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

## 2020-09-01 NOTE — TELEPHONE ENCOUNTER
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

## 2020-09-03 NOTE — TELEPHONE ENCOUNTER
Provider's message shared with PT   PT expressed frustration with not  participating in her clinical program

## 2020-09-03 NOTE — TELEPHONE ENCOUNTER
She will not be able to be out of the boot into clinical shoes until it is clinically appropriate  Unfortunately, her injury requires more conservative care until it heals  We can't write a note that allows her to spend time out of the boot

## 2020-09-03 NOTE — TELEPHONE ENCOUNTER
PT returned to office stating that most recent letter was not acceptable for her college clinical program  In order for her to participate, school requires a note stating she can wear clinical shoes and remain sitting during her entire clinical session  She will not be required to walk  PT has to attend clinical 1 day per week for 4 weeks  Is this note possible  PT would like to wait in [facility] office for response to her request, as she lives 40 min away  Please direct   Thanks

## 2020-09-16 ENCOUNTER — TELEPHONE (OUTPATIENT)
Dept: OBGYN CLINIC | Facility: MEDICAL CENTER | Age: 25
End: 2020-09-16

## 2020-09-16 NOTE — TELEPHONE ENCOUNTER
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

## 2020-09-16 NOTE — TELEPHONE ENCOUNTER
I called and advised that patient is to just start to be out of the boot on 9/24  Patient states that her Physical Therapist has been working with her and advised her she is ready to be out of sneaker 3-5 hrs and is doing well with it  Therapist feels she is ahead of schedule with healing  She will be sitting in a chair cleaning teeth  She needs this to be able to take her boards end of October   Please advise if you would be willing to speak to Therapist?

## 2020-09-23 NOTE — TELEPHONE ENCOUNTER
PT's Physical therapist from 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

## 2020-10-08 ENCOUNTER — OFFICE VISIT (OUTPATIENT)
Dept: OBGYN CLINIC | Facility: CLINIC | Age: 25
End: 2020-10-08

## 2020-10-08 VITALS — DIASTOLIC BLOOD PRESSURE: 68 MMHG | SYSTOLIC BLOOD PRESSURE: 120 MMHG

## 2020-10-08 DIAGNOSIS — S84.22XS: Primary | ICD-10-CM

## 2020-10-08 DIAGNOSIS — S91.012D LACERATION OF LEFT ANKLE, SUBSEQUENT ENCOUNTER: ICD-10-CM

## 2020-10-08 DIAGNOSIS — S85.142S: ICD-10-CM

## 2020-10-08 PROCEDURE — 99024 POSTOP FOLLOW-UP VISIT: CPT | Performed by: ORTHOPAEDIC SURGERY

## 2022-04-22 ENCOUNTER — TELEPHONE (OUTPATIENT)
Dept: PERINATAL CARE | Facility: OTHER | Age: 27
End: 2022-04-22

## 2022-04-22 NOTE — TELEPHONE ENCOUNTER
We received direct fax, at the [address] [place] [address] office, with records for pt BUT the referral sheet indicated the pt was being referred to [facility] for her ultrasounds  Called OB office, they said info was sent to us in error and they will re-send to [facility]  I left voicemail for pt with information above  And concluded that perhaps she should contact OB to confirm where they are referring her for care if she has any additional questions

## 2022-05-08 NOTE — PROGRESS NOTES
Please refer to the [facility] ultrasound report in Ob Procedures for additional information regarding today's visit

## 2022-05-09 ENCOUNTER — ROUTINE PRENATAL (OUTPATIENT)
Dept: PERINATAL CARE | Facility: OTHER | Age: 27
End: 2022-05-09
Payer: COMMERCIAL

## 2022-05-09 VITALS
DIASTOLIC BLOOD PRESSURE: 67 MMHG | HEIGHT: 67 IN | BODY MASS INDEX: 26.59 KG/M2 | HEART RATE: 85 BPM | WEIGHT: 169.4 LBS | SYSTOLIC BLOOD PRESSURE: 100 MMHG

## 2022-05-09 DIAGNOSIS — O35.8XX0 FETAL RENAL ANOMALY, SINGLE GESTATION: ICD-10-CM

## 2022-05-09 DIAGNOSIS — Z36.3 ENCOUNTER FOR ANTENATAL SCREENING FOR MALFORMATIONS: Primary | ICD-10-CM

## 2022-05-09 DIAGNOSIS — Z36.86 ENCOUNTER FOR ANTENATAL SCREENING FOR CERVICAL LENGTH: ICD-10-CM

## 2022-05-09 DIAGNOSIS — Z3A.20 20 WEEKS GESTATION OF PREGNANCY: ICD-10-CM

## 2022-05-09 PROBLEM — O35.EXX0 FETAL RENAL ANOMALY, SINGLE GESTATION: Status: ACTIVE | Noted: 2022-05-09

## 2022-05-09 PROCEDURE — 99212 OFFICE O/P EST SF 10 MIN: CPT | Performed by: OBSTETRICS & GYNECOLOGY

## 2022-05-09 PROCEDURE — 76805 OB US >/= 14 WKS SNGL FETUS: CPT | Performed by: OBSTETRICS & GYNECOLOGY

## 2022-05-09 PROCEDURE — 76817 TRANSVAGINAL US OBSTETRIC: CPT | Performed by: OBSTETRICS & GYNECOLOGY

## 2022-05-09 RX ORDER — PROGESTERONE 100 MG/1
100 CAPSULE ORAL DAILY
COMMUNITY

## 2022-05-09 RX ORDER — PROGESTERONE 200 MG/1
CAPSULE ORAL
COMMUNITY
Start: 2022-04-14

## 2022-05-09 RX ORDER — LEVOTHYROXINE SODIUM 0.05 MG/1
50 TABLET ORAL DAILY
COMMUNITY

## 2022-05-09 NOTE — PROGRESS NOTES
Ultrasound Probe Disinfection    A transvaginal ultrasound was performed  Prior to use, disinfection was performed with High Level Disinfection Process (Trophon)  Probe serial number [id number]: [id number] was used         Riki Booth RDMS  05/09/22  2:47 PM

## 2022-05-19 ENCOUNTER — TELEPHONE (OUTPATIENT)
Dept: PERINATAL CARE | Facility: OTHER | Age: 27
End: 2022-05-19

## 2022-05-19 NOTE — TELEPHONE ENCOUNTER
Left patient a message that her MFM appointment had to be rescheduled  The new time, date and location were provided - 8/5/22  3:30  [facility]  The patient has been instructed to please call us back at [phone] with any questions or concerns

## 2022-06-10 ENCOUNTER — ULTRASOUND (OUTPATIENT)
Dept: PERINATAL CARE | Facility: OTHER | Age: 27
End: 2022-06-10
Payer: COMMERCIAL

## 2022-06-10 VITALS
DIASTOLIC BLOOD PRESSURE: 74 MMHG | HEART RATE: 90 BPM | WEIGHT: 177.8 LBS | BODY MASS INDEX: 27.91 KG/M2 | SYSTOLIC BLOOD PRESSURE: 132 MMHG | HEIGHT: 67 IN

## 2022-06-10 DIAGNOSIS — Z3A.25 25 WEEKS GESTATION OF PREGNANCY: ICD-10-CM

## 2022-06-10 DIAGNOSIS — O35.8XX0 FETAL RENAL ANOMALY, SINGLE GESTATION: Primary | ICD-10-CM

## 2022-06-10 PROBLEM — E03.9 HYPOTHYROIDISM: Status: ACTIVE | Noted: 2022-06-10

## 2022-06-10 PROCEDURE — 76816 OB US FOLLOW-UP PER FETUS: CPT | Performed by: OBSTETRICS & GYNECOLOGY

## 2022-06-10 PROCEDURE — 99213 OFFICE O/P EST LOW 20 MIN: CPT | Performed by: OBSTETRICS & GYNECOLOGY

## 2022-06-10 NOTE — PROGRESS NOTES
The patient was seen today for an ultrasound  Please see ultrasound report (located under Ob Procedures) for additional details  Thank you very much for allowing us to participate in the care of this very nice patient  Should you have any questions, please do not hesitate to contact me  Chele Pino MD [facility]  Attending Physician, Sandhya

## 2022-08-04 NOTE — PROGRESS NOTES
Please refer to the [facility] ultrasound report in Ob Procedures for additional information regarding today's visit

## 2022-08-05 ENCOUNTER — ULTRASOUND (OUTPATIENT)
Dept: PERINATAL CARE | Facility: OTHER | Age: 27
End: 2022-08-05
Payer: COMMERCIAL

## 2022-08-05 VITALS
HEART RATE: 71 BPM | BODY MASS INDEX: 28.56 KG/M2 | WEIGHT: 182 LBS | HEIGHT: 67 IN | SYSTOLIC BLOOD PRESSURE: 123 MMHG | DIASTOLIC BLOOD PRESSURE: 67 MMHG

## 2022-08-05 DIAGNOSIS — Z3A.33 33 WEEKS GESTATION OF PREGNANCY: ICD-10-CM

## 2022-08-05 DIAGNOSIS — Z36.4 ULTRASOUND FOR ANTENATAL SCREENING FOR FETAL GROWTH RESTRICTION: Primary | ICD-10-CM

## 2022-08-05 DIAGNOSIS — O35.EXX0 ENCOUNTER FOR REPEAT ULTRASOUND OF FETAL PYELECTASIS IN SINGLETON PREGNANCY, ANTEPARTUM: ICD-10-CM

## 2022-08-05 PROCEDURE — 76816 OB US FOLLOW-UP PER FETUS: CPT | Performed by: OBSTETRICS & GYNECOLOGY

## 2022-08-05 PROCEDURE — 99213 OFFICE O/P EST LOW 20 MIN: CPT | Performed by: OBSTETRICS & GYNECOLOGY

## 2022-08-05 RX ORDER — NITROFURANTOIN 25; 75 MG/1; MG/1
CAPSULE ORAL
COMMUNITY
Start: 2022-08-01

## 2022-08-05 NOTE — PATIENT INSTRUCTIONS
Kick Counts in Pregnancy   WHAT YOU NEED TO KNOW:   Kick counts measure how much your baby is moving in your womb  A kick from your baby can be felt as a twist, turn, swish, roll, or jab  It is common to feel your baby kicking at 26 to 28 weeks of pregnancy  You may feel your baby kick as early as 20 weeks of pregnancy  You may want to start counting at 28 weeks  DISCHARGE INSTRUCTIONS:   Contact your doctor immediately if:   You feel a change in the number of kicks or movements of your baby  You feel fewer than 10 kicks within 2 hours  You have questions or concerns about your baby's movements  Why measure kick counts:  Your baby's movement may provide information about your baby's health  He or she may move less, or not at all, if there are problems  Your baby may move less if he or she is not getting enough oxygen or nutrition from the placenta  Do not smoke while you are pregnant  Smoking decreases the amount of oxygen that gets to your baby  Talk to your healthcare provider if you need help to quit smoking  Tell your healthcare provider as soon as you feel a change in your baby's movements  When to measure kick counts:   Measure kick counts at the same time every day  Measure kick counts when your baby is awake and most active  Your baby may be most active in the evening  How to measure kick counts:  Check that your baby is awake before you measure kick counts  You can wake up your baby by lightly pushing on your belly, walking, or drinking something cold  Your healthcare provider may tell you different ways to measure kick counts  You may be told to do the following:  Use a chart or clock to keep track of the time you start and finish counting  Sit in a chair or lie on your left side  Place your hands on the largest part of your belly  Count until you reach 10 kicks  Write down how much time it takes to count 10 kicks  It may take 30 minutes to 2 hours to count 10 kicks  It should not take more than 2 hours to count 10 kicks  Follow up with your doctor as directed:  Write down your questions so you remember to ask them during your visits  © Copyright "[employer]" 2022 Information is for End User's use only and may not be sold, redistributed or otherwise used for commercial purposes  All illustrations and images included in CareNotes® are the copyrighted property of A D A M , Inc  or [facility] Daniel Caal   The above information is an  only  It is not intended as medical advice for individual conditions or treatments  Talk to your doctor, nurse or pharmacist before following any medical regimen to see if it is safe and effective for you

## (undated) DEVICE — SUT ETHILON 3-0 PS-1 18 IN 1663H

## (undated) DEVICE — SPONGE SCRUB 4 PCT CHLORHEXIDINE

## (undated) DEVICE — CAST PADDING 4 IN STERILE

## (undated) DEVICE — ABDOMINAL PAD: Brand: DERMACEA

## (undated) DEVICE — DRESSING XEROFORM 5 X 9

## (undated) DEVICE — GLOVE SRG BIOGEL 8

## (undated) DEVICE — SUT ETHIBOND 0 SH/SH 36 IN X524H

## (undated) DEVICE — GLOVE INDICATOR PI UNDERGLOVE SZ 8 BLUE

## (undated) DEVICE — INTENDED FOR TISSUE SEPARATION, AND OTHER PROCEDURES THAT REQUIRE A SHARP SURGICAL BLADE TO PUNCTURE OR CUT.: Brand: BARD-PARKER ® CARBON RIB-BACK BLADES

## (undated) DEVICE — CUFF TOURNIQUET 30 X 4 IN QUICK CONNECT DISP 1BLA

## (undated) DEVICE — SPECIMEN CONTAINER STERILE PEEL PACK

## (undated) DEVICE — BANDAGE, ESMARK LF STR 6"X9' (20/CS): Brand: CYPRESS

## (undated) DEVICE — BETHLEHEM UNIV MAJ EXT ,KIT: Brand: CARDINAL HEALTH

## (undated) DEVICE — ACE WRAP 6 IN UNSTERILE

## (undated) DEVICE — SUT VICRYL 4-0 PS-2 18 IN J496G

## (undated) DEVICE — SPLINT 5 X 30 IN XFAST SET PLASTER

## (undated) DEVICE — CAST PADDING 4 IN UNSTERILE

## (undated) DEVICE — SUT VICRYL 2-0 CT-1 27 IN J259H

## (undated) DEVICE — 3M™ DURAPORE™ SURGICAL TAPE 1538-1, 1 INCH X 10 YARD (2,5CM X 9,1M), 12 ROLLS/BOX: Brand: 3M™ DURAPORE™

## (undated) DEVICE — SUT VICRYL 0 CT-1 27 IN J260H

## (undated) DEVICE — NEEDLE HYPO 22G X 1-1/2 IN

## (undated) DEVICE — CHLORAPREP HI-LITE 26ML ORANGE

## (undated) DEVICE — INTENDED FOR TISSUE SEPARATION, AND OTHER PROCEDURES THAT REQUIRE A SHARP SURGICAL BLADE TO PUNCTURE OR CUT.: Brand: BARD-PARKER SAFETY BLADES SIZE 15, STERILE

## (undated) DEVICE — SUT ETHIBOND 2-0 V-7 30 IN B964H

## (undated) DEVICE — SYRINGE 30ML LL

## (undated) DEVICE — PENCIL ELECTROSURG E-Z CLEAN -0035H